# Patient Record
Sex: MALE | Race: WHITE | NOT HISPANIC OR LATINO | Employment: FULL TIME | URBAN - METROPOLITAN AREA
[De-identification: names, ages, dates, MRNs, and addresses within clinical notes are randomized per-mention and may not be internally consistent; named-entity substitution may affect disease eponyms.]

---

## 2017-01-10 ENCOUNTER — OFFICE VISIT (OUTPATIENT)
Dept: FAMILY MEDICINE | Facility: CLINIC | Age: 27
End: 2017-01-10
Payer: COMMERCIAL

## 2017-01-10 VITALS
RESPIRATION RATE: 18 BRPM | DIASTOLIC BLOOD PRESSURE: 72 MMHG | SYSTOLIC BLOOD PRESSURE: 120 MMHG | TEMPERATURE: 99 F | WEIGHT: 205.94 LBS | BODY MASS INDEX: 28.83 KG/M2 | HEART RATE: 72 BPM | HEIGHT: 71 IN

## 2017-01-10 DIAGNOSIS — R53.83 OTHER MALAISE AND FATIGUE: Primary | ICD-10-CM

## 2017-01-10 DIAGNOSIS — J11.1 FLU: ICD-10-CM

## 2017-01-10 DIAGNOSIS — R53.81 OTHER MALAISE AND FATIGUE: Primary | ICD-10-CM

## 2017-01-10 DIAGNOSIS — J32.9 SINUSITIS, UNSPECIFIED CHRONICITY, UNSPECIFIED LOCATION: ICD-10-CM

## 2017-01-10 DIAGNOSIS — R50.9 FEVER, UNSPECIFIED FEVER CAUSE: ICD-10-CM

## 2017-01-10 LAB
CTP QC/QA: YES
FLUAV AG NPH QL: POSITIVE
FLUBV AG NPH QL: NEGATIVE

## 2017-01-10 PROCEDURE — 1159F MED LIST DOCD IN RCRD: CPT | Mod: S$GLB,,, | Performed by: FAMILY MEDICINE

## 2017-01-10 PROCEDURE — 87804 INFLUENZA ASSAY W/OPTIC: CPT | Mod: QW,S$GLB,, | Performed by: FAMILY MEDICINE

## 2017-01-10 PROCEDURE — 99213 OFFICE O/P EST LOW 20 MIN: CPT | Mod: 25,S$GLB,, | Performed by: FAMILY MEDICINE

## 2017-01-10 PROCEDURE — 99999 PR PBB SHADOW E&M-EST. PATIENT-LVL II: CPT | Mod: PBBFAC,,, | Performed by: FAMILY MEDICINE

## 2017-01-10 PROCEDURE — 96372 THER/PROPH/DIAG INJ SC/IM: CPT | Mod: S$GLB,,, | Performed by: FAMILY MEDICINE

## 2017-01-10 RX ORDER — METHYLPREDNISOLONE ACETATE 40 MG/ML
40 INJECTION, SUSPENSION INTRA-ARTICULAR; INTRALESIONAL; INTRAMUSCULAR; SOFT TISSUE
Status: COMPLETED | OUTPATIENT
Start: 2017-01-10 | End: 2017-01-10

## 2017-01-10 RX ORDER — FLUTICASONE PROPIONATE 50 MCG
1 SPRAY, SUSPENSION (ML) NASAL 2 TIMES DAILY
Qty: 1 BOTTLE | Refills: 11 | Status: SHIPPED | OUTPATIENT
Start: 2017-01-10 | End: 2017-06-19

## 2017-01-10 RX ORDER — PROMETHAZINE HYDROCHLORIDE AND CODEINE PHOSPHATE 6.25; 1 MG/5ML; MG/5ML
5 SOLUTION ORAL EVERY 6 HOURS PRN
Qty: 150 ML | Refills: 1 | Status: SHIPPED | OUTPATIENT
Start: 2017-01-10 | End: 2017-06-19

## 2017-01-10 RX ORDER — LINCOMYCIN HYDROCHLORIDE 300 MG/ML
600 INJECTION, SOLUTION INTRAMUSCULAR; INTRAVENOUS; SUBCONJUNCTIVAL
Status: COMPLETED | OUTPATIENT
Start: 2017-01-10 | End: 2017-01-10

## 2017-01-10 RX ORDER — OSELTAMIVIR PHOSPHATE 75 MG/1
75 CAPSULE ORAL 2 TIMES DAILY
Qty: 10 CAPSULE | Refills: 0 | Status: SHIPPED | OUTPATIENT
Start: 2017-01-10 | End: 2017-06-19

## 2017-01-10 RX ORDER — AZITHROMYCIN 250 MG/1
250 TABLET, FILM COATED ORAL DAILY
Qty: 6 TABLET | Refills: 0 | Status: SHIPPED | OUTPATIENT
Start: 2017-01-10 | End: 2017-01-15

## 2017-01-10 RX ORDER — AZELASTINE 1 MG/ML
1 SPRAY, METERED NASAL 2 TIMES DAILY
Qty: 1 ML | Refills: 11 | Status: SHIPPED | OUTPATIENT
Start: 2017-01-10 | End: 2017-06-19

## 2017-01-10 RX ADMIN — LINCOMYCIN HYDROCHLORIDE 600 MG: 300 INJECTION, SOLUTION INTRAMUSCULAR; INTRAVENOUS; SUBCONJUNCTIVAL at 12:01

## 2017-01-10 RX ADMIN — METHYLPREDNISOLONE ACETATE 40 MG: 40 INJECTION, SUSPENSION INTRA-ARTICULAR; INTRALESIONAL; INTRAMUSCULAR; SOFT TISSUE at 12:01

## 2017-01-10 NOTE — PROGRESS NOTES
Subjective:       Patient ID: Rojelio Perez is a 26 y.o. male.    Chief Complaint: Cough; Nasal Congestion; Fever; and Generalized Body Aches    HPI Comments: Pt is a 26 y.o. male who presents for check up for Other malaise and fatigue & 102 fever  (primary encounter diagnosis)  Fever, unspecified fever cause. Doing well on current meds. Denies any side effects. Prevention is up to date.    Review of Systems   Constitutional: Positive for fever. Negative for appetite change.   HENT: Negative for congestion, ear pain, sneezing and sore throat.    Eyes: Negative for redness and visual disturbance.   Respiratory: Negative for cough, chest tightness and stridor.    Cardiovascular: Negative for chest pain.   Gastrointestinal: Negative for abdominal pain, blood in stool, diarrhea, nausea and vomiting.   Genitourinary: Negative for difficulty urinating, dysuria and hematuria.   Musculoskeletal: Negative for arthralgias, back pain, joint swelling, myalgias and neck pain.   Skin: Negative for rash.   Neurological: Negative for dizziness.   Psychiatric/Behavioral: Negative for agitation. The patient is not nervous/anxious.        Objective:      Physical Exam   Constitutional: He is oriented to person, place, and time. He appears well-developed and well-nourished.   HENT:   Head: Normocephalic.   Eyes: Pupils are equal, round, and reactive to light.   Neck: Normal range of motion. Neck supple. No thyromegaly present.   Cardiovascular: Normal rate and regular rhythm.  Exam reveals no friction rub.    No murmur heard.  Pulmonary/Chest: Effort normal. No respiratory distress. He has no wheezes.   Abdominal: There is no tenderness. There is no rebound and no guarding.   Musculoskeletal: Normal range of motion. He exhibits no edema or tenderness.   Lymphadenopathy:     He has no cervical adenopathy.   Neurological: He is alert and oriented to person, place, and time. He has normal reflexes. No cranial nerve deficit.   Skin: Skin  is warm and dry.   Psychiatric: He has a normal mood and affect. Judgment and thought content normal.       Assessment:       1. Other malaise and fatigue    2. Fever, unspecified fever cause    3. Sinusitis, unspecified chronicity, unspecified location    4. Flu        Plan:   Rojelio was seen today for cough, nasal congestion, fever and generalized body aches.    Diagnoses and all orders for this visit:    Other malaise and fatigue  -     POCT Influenza A/B    Fever, unspecified fever cause  -     promethazine-codeine 6.25-10 mg/5 ml (PHENERGAN WITH CODEINE) 6.25-10 mg/5 mL syrup; Take 5 mLs by mouth every 6 (six) hours as needed for Cough.  -     azithromycin (Z-LAUREN) 250 MG tablet; Take 1 tablet (250 mg total) by mouth once daily.  -     azelastine (ASTELIN) 137 mcg (0.1 %) nasal spray; 1 spray (137 mcg total) by Nasal route 2 (two) times daily.  -     fluticasone (FLONASE) 50 mcg/actuation nasal spray; 1 spray by Each Nare route 2 (two) times daily.    Sinusitis, unspecified chronicity, unspecified location  -     promethazine-codeine 6.25-10 mg/5 ml (PHENERGAN WITH CODEINE) 6.25-10 mg/5 mL syrup; Take 5 mLs by mouth every 6 (six) hours as needed for Cough.  -     azithromycin (Z-LAUREN) 250 MG tablet; Take 1 tablet (250 mg total) by mouth once daily.  -     azelastine (ASTELIN) 137 mcg (0.1 %) nasal spray; 1 spray (137 mcg total) by Nasal route 2 (two) times daily.  -     fluticasone (FLONASE) 50 mcg/actuation nasal spray; 1 spray by Each Nare route 2 (two) times daily.    Flu  -     oseltamivir (TAMIFLU) 75 MG capsule; Take 1 capsule (75 mg total) by mouth 2 (two) times daily.    Other orders  -     lincomycin injection 600 mg; Inject 2 mLs (600 mg total) into the muscle one time.  -     methylPREDNISolone acetate injection 40 mg; Inject 1 mL (40 mg total) into the muscle one time.

## 2017-01-10 NOTE — MR AVS SNAPSHOT
[place] [address]  [facility] [address]  Phone: [phone]  Fax: [phone]                  Rojelio Perez   1/10/2017 10:45 AM   Office Visit    Description:  Male : 1990   Provider:  Tyler Lewis MD   Department:  [facility]           Reason for Visit     Cough     Nasal Congestion     Fever     Generalized Body Aches           Diagnoses this Visit        Comments    Other malaise and fatigue    -  Primary     Fever, unspecified fever cause         Sinusitis, unspecified chronicity, unspecified location         Flu                To Do List           Goals (5 Years of Data)     None       These Medications        Disp Refills Start End    promethazine-codeine 6.25-10 mg/5 ml (PHENERGAN WITH CODEINE) 6.25-10 mg/5 mL syrup 150 mL 1 1/10/2017     Take 5 mLs by mouth every 6 (six) hours as needed for Cough. - Oral    Pharmacy: [facility]/pharmacy #[address] [place] [address] [place] - [address] Ph #: [phone]       azithromycin (Z-LAUREN) 250 MG tablet 6 tablet 0 1/10/2017 1/15/2017    Take 1 tablet (250 mg total) by mouth once daily. - Oral    Pharmacy: [facility]/pharmacy #[address] [place] [address] [place] - [address] Ph #: [phone]       azelastine (ASTELIN) 137 mcg (0.1 %) nasal spray 1 mL 11 1/10/2017     1 spray (137 mcg total) by Nasal route 2 (two) times daily. - Nasal    Pharmacy: [facility]/pharmacy #[address] [place] [address] [place] - [address] Ph #: [phone]       fluticasone (FLONASE) 50 mcg/actuation nasal spray 1 Bottle 11 1/10/2017     1 spray by Each Nare route 2 (two) times daily. - Each Nare    Pharmacy: [facility]/pharmacy #[address] [place] [address] [place] - [address] Ph #: [phone]       oseltamivir (TAMIFLU) 75 MG capsule 10 capsule 0 1/10/2017     Take 1 capsule (75 mg total) by mouth 2 (two) times daily. - Oral    Pharmacy: [facility]/pharmacy #[address] [place] [address] [place] - [address] Ph #: [phone]         [facility]     [facility] Care [facility] -  Assistance  Registered nurses in  the [facility] provide clinical advisement, health education, appointment booking, and other advisory services.  Call for this free service at [phone].             Medications           Message regarding Medications     Verify the changes and/or additions to your medication regime listed below are the same as discussed with your clinician today.  If any of these changes or additions are incorrect, please notify your healthcare provider.        START taking these NEW medications        Refills    promethazine-codeine 6.25-10 mg/5 ml (PHENERGAN WITH CODEINE) 6.25-10 mg/5 mL syrup 1    Sig: Take 5 mLs by mouth every 6 (six) hours as needed for Cough.    Class: Normal    Route: Oral    azithromycin (Z-LAUREN) 250 MG tablet 0    Sig: Take 1 tablet (250 mg total) by mouth once daily.    Class: Normal    Route: Oral    azelastine (ASTELIN) 137 mcg (0.1 %) nasal spray 11    Si spray (137 mcg total) by Nasal route 2 (two) times daily.    Class: Normal    Route: Nasal    fluticasone (FLONASE) 50 mcg/actuation nasal spray 11    Si spray by Each Nare route 2 (two) times daily.    Class: Normal    Route: Each Nare    oseltamivir (TAMIFLU) 75 MG capsule 0    Sig: Take 1 capsule (75 mg total) by mouth 2 (two) times daily.    Class: Normal    Route: Oral      These medications were administered today        Dose Freq    lincomycin injection 600 mg 600 mg Clinic/HOD 1 time    Sig: Inject 2 mLs (600 mg total) into the muscle one time.    Class: Normal    Route: Intramuscular    methylPREDNISolone acetate injection 40 mg 40 mg Clinic/HOD 1 time    Sig: Inject 1 mL (40 mg total) into the muscle one time.    Class: Normal    Route: Intramuscular      STOP taking these medications     alprazolam (XANAX) 0.5 MG tablet TAKE 1 TABLET BY MOUTH EVERY DAY AS NEEDED           Verify that the below list of medications is an accurate representation of the medications you are currently taking.  If none reported, the list  "may be blank. If incorrect, please contact your healthcare provider. Carry this list with you in case of emergency.           Current Medications     azelastine (ASTELIN) 137 mcg (0.1 %) nasal spray 1 spray (137 mcg total) by Nasal route 2 (two) times daily.    azithromycin (Z-LAUREN) 250 MG tablet Take 1 tablet (250 mg total) by mouth once daily.    clobetasol (TEMOVATE) 0.05 % cream Apply topically 2 (two) times daily.    fluticasone (FLONASE) 50 mcg/actuation nasal spray 1 spray by Each Nare route 2 (two) times daily.    oseltamivir (TAMIFLU) 75 MG capsule Take 1 capsule (75 mg total) by mouth 2 (two) times daily.    promethazine-codeine 6.25-10 mg/5 ml (PHENERGAN WITH CODEINE) 6.25-10 mg/5 mL syrup Take 5 mLs by mouth every 6 (six) hours as needed for Cough.    tramadol (ULTRAM) 50 mg tablet Take 1 tablet (50 mg total) by mouth every 6 (six) hours as needed for Pain.    vilazodone 40 mg Tab Take 1 capsule by mouth once.           Clinical Reference Information           Vital Signs - Last Recorded  Most recent update: 1/10/2017 11:58 AM by Ursula Beyer LPN    BP Pulse Temp Resp    120/72 (BP Location: Right arm, Patient Position: Sitting, BP Method: Manual) 72 99.1 °F (37.3 °C) (Tympanic) 18    Ht Wt BMI    5' 11" (1.803 m) 93.4 kg (205 lb 14.6 oz) 28.72 kg/m2      Blood Pressure          Most Recent Value    BP  120/72      Allergies as of 1/10/2017     Ceclor [Cefaclor]      Immunizations Administered on Date of Encounter - 1/10/2017     None      Orders Placed During Today's Visit      Normal Orders This Visit    POCT Influenza A/B          1/10/2017 11:58 AM - Sharon Castanon      Component Results     Component Value Flag Ref Range Units Status    Rapid Influenza A Ag Positive (A) Negative  Final    Rapid Influenza B Ag Negative  Negative  Final     Acceptable Yes    Final            [facility] Sign-Up     Activating your [facility] account is as easy as 1-2-3!     1) Visit [email or web address], " select Sign Up Now, enter this activation code and your date of birth, then select Next.  [email or web address]  Expires: 2/24/2017 12:17 PM      2) Create a username and password to use when you visit [facility] in the future and select a security question in case you lose your password and select Next.    3) Enter your e-mail address and click Sign Up!    Additional Information  If you have questions, please e-mail [email or web address] or call [phone] to talk to our [facility] staff. Remember, [facility] is NOT to be used for urgent needs. For medical emergencies, dial 911.

## 2017-01-12 ENCOUNTER — TELEPHONE (OUTPATIENT)
Dept: FAMILY MEDICINE | Facility: CLINIC | Age: 27
End: 2017-01-12

## 2017-01-12 NOTE — TELEPHONE ENCOUNTER
----- Message from Isaiah Rodrigues sent at 2017  9:14 AM CST -----  Contact: Patient  Rojelio Perez  MRN: [id number]  : 1990  PCP: Manuel Gomez  Home Phone      [phone]  Work Phone      Not on file.  Mobile          [phone]      MESSAGE: requesting work excuse until tomorrow -- ran fever again last night -- fax to [phone]    Call [phone]    PCP: Gomez (saw Marcello)

## 2017-03-14 ENCOUNTER — HOSPITAL ENCOUNTER (EMERGENCY)
Facility: HOSPITAL | Age: 27
Discharge: HOME | End: 2017-03-14
Attending: FAMILY MEDICINE
Payer: COMMERCIAL

## 2017-03-14 VITALS
BODY MASS INDEX: 27.2 KG/M2 | SYSTOLIC BLOOD PRESSURE: 120 MMHG | TEMPERATURE: 97 F | WEIGHT: 195 LBS | RESPIRATION RATE: 17 BRPM | DIASTOLIC BLOOD PRESSURE: 72 MMHG | HEART RATE: 60 BPM

## 2017-03-14 DIAGNOSIS — M54.50 CHRONIC LEFT-SIDED LOW BACK PAIN WITHOUT SCIATICA: Primary | ICD-10-CM

## 2017-03-14 DIAGNOSIS — G89.29 CHRONIC LEFT-SIDED LOW BACK PAIN WITHOUT SCIATICA: Primary | ICD-10-CM

## 2017-03-14 PROCEDURE — 63600175 PHARM REV CODE 636 W HCPCS: Performed by: FAMILY MEDICINE

## 2017-03-14 PROCEDURE — 96372 THER/PROPH/DIAG INJ SC/IM: CPT

## 2017-03-14 PROCEDURE — 99283 EMERGENCY DEPT VISIT LOW MDM: CPT | Mod: 25

## 2017-03-14 RX ORDER — DICLOFENAC SODIUM 75 MG/1
75 TABLET, DELAYED RELEASE ORAL 2 TIMES DAILY PRN
Qty: 15 TABLET | Refills: 0 | Status: SHIPPED | OUTPATIENT
Start: 2017-03-14 | End: 2017-09-19

## 2017-03-14 RX ORDER — HYDROMORPHONE HYDROCHLORIDE 2 MG/ML
2 INJECTION, SOLUTION INTRAMUSCULAR; INTRAVENOUS; SUBCUTANEOUS
Status: COMPLETED | OUTPATIENT
Start: 2017-03-14 | End: 2017-03-14

## 2017-03-14 RX ORDER — METHOCARBAMOL 750 MG/1
1500 TABLET, FILM COATED ORAL 3 TIMES DAILY
Qty: 30 TABLET | Refills: 0 | Status: SHIPPED | OUTPATIENT
Start: 2017-03-14 | End: 2017-03-24

## 2017-03-14 RX ORDER — PROMETHAZINE HYDROCHLORIDE 25 MG/ML
25 INJECTION, SOLUTION INTRAMUSCULAR; INTRAVENOUS
Status: COMPLETED | OUTPATIENT
Start: 2017-03-14 | End: 2017-03-14

## 2017-03-14 RX ADMIN — PROMETHAZINE HYDROCHLORIDE 25 MG: 25 INJECTION INTRAMUSCULAR; INTRAVENOUS at 01:03

## 2017-03-14 RX ADMIN — HYDROMORPHONE HYDROCHLORIDE 2 MG: 2 INJECTION INTRAMUSCULAR; INTRAVENOUS; SUBCUTANEOUS at 01:03

## 2017-03-14 NOTE — ED AVS SNAPSHOT
[facility] [address]  [facility] [address]               Rojelio Perez   3/14/2017 12:51 AM   ED    Description:  Male : 1990   Department:  [facility]           Your Care was Coordinated By:     Provider Role From To    Efrem Abbott MD Attending Provider 17 0051 --      Reason for Visit     Back Pain           Diagnoses this Visit        Comments    Chronic left-sided low back pain without sciatica    -  Primary       ED Disposition     ED Disposition Condition Comment    Discharge             To Do List           Follow-up Information     Follow up with Manuel Gomez MD In 1 week(s).    Specialty:  Family Medicine    Contact information:    [address]  [facility] [address]  [phone]         These Medications        Disp Refills Start End    methocarbamol (ROBAXIN) 750 MG Tab 30 tablet 0 3/14/2017 3/24/2017    Take 2 tablets (1,500 mg total) by mouth 3 (three) times daily. - Oral    Pharmacy: [facility]/pharmacy #[id number] - [place] - [address] Ph #: [phone]       diclofenac (VOLTAREN) 75 MG EC tablet 15 tablet 0 3/14/2017     Take 1 tablet (75 mg total) by mouth 2 (two) times daily as needed (Pain). - Oral    Pharmacy: [facility]/pharmacy #[id number] - [place] - [address] Ph #: [phone]         [facility]     [facility] Care [facility] -  Assistance  Registered nurses in the [facility] provide clinical advisement, health education, appointment booking, and other advisory services.  Call for this free service at [phone].             Medications           Message regarding Medications     Verify the changes and/or additions to your medication regime listed below are the same as discussed with your clinician today.  If any of these changes or additions are incorrect, please notify your healthcare provider.        START taking these NEW medications        Refills    methocarbamol (ROBAXIN)  750 MG Tab 0    Sig: Take 2 tablets (1,500 mg total) by mouth 3 (three) times daily.    Class: Print    Route: Oral    diclofenac (VOLTAREN) 75 MG EC tablet 0    Sig: Take 1 tablet (75 mg total) by mouth 2 (two) times daily as needed (Pain).    Class: Print    Route: Oral      These medications were administered today        Dose Freq    promethazine injection 25 mg 25 mg ED 1 Time    Sig: Inject 1 mL (25 mg total) into the muscle ED 1 Time.    Class: Normal    Route: Intramuscular    hydromorphone (PF) injection 2 mg 2 mg ED 1 Time    Sig: Inject 1 mL (2 mg total) into the muscle ED 1 Time.    Class: Normal    Route: Intramuscular           Verify that the below list of medications is an accurate representation of the medications you are currently taking.  If none reported, the list may be blank. If incorrect, please contact your healthcare provider. Carry this list with you in case of emergency.           Current Medications     azelastine (ASTELIN) 137 mcg (0.1 %) nasal spray 1 spray (137 mcg total) by Nasal route 2 (two) times daily.    clobetasol (TEMOVATE) 0.05 % cream Apply topically 2 (two) times daily.    diclofenac (VOLTAREN) 75 MG EC tablet Take 1 tablet (75 mg total) by mouth 2 (two) times daily as needed (Pain).    fluticasone (FLONASE) 50 mcg/actuation nasal spray 1 spray by Each Nare route 2 (two) times daily.    methocarbamol (ROBAXIN) 750 MG Tab Take 2 tablets (1,500 mg total) by mouth 3 (three) times daily.    oseltamivir (TAMIFLU) 75 MG capsule Take 1 capsule (75 mg total) by mouth 2 (two) times daily.    promethazine-codeine 6.25-10 mg/5 ml (PHENERGAN WITH CODEINE) 6.25-10 mg/5 mL syrup Take 5 mLs by mouth every 6 (six) hours as needed for Cough.    tramadol (ULTRAM) 50 mg tablet Take 1 tablet (50 mg total) by mouth every 6 (six) hours as needed for Pain.    vilazodone 40 mg Tab Take 1 capsule by mouth once.           Clinical Reference Information           Your Vitals Were     BP Pulse Temp  Resp Weight BMI    117/70 (BP Location: Left arm, Patient Position: Sitting) 62 97.2 °F (36.2 °C) (Oral) 17 88.5 kg (195 lb) 27.2 kg/m2      Allergies as of 3/14/2017        Reactions    Ceclor [Cefaclor] Other (See Comments)    unknown      Immunizations Administered on Date of Encounter - 3/14/2017     None      ED Micro, Lab, POCT     None      ED Imaging Orders     None        Discharge Instructions           Causes of Lumbar (Low Back) Pain  Low back pain can be caused by problems with any part of the lumbar spine. A disk can herniate (push out) and press on a nerve. Vertebrae can rub against each other or slip out of place. This can irritate facet joints and nerves. It can also lead to stenosis, a narrowing of the spinal canal or foramen.  Pressure from a disk  Constant wear and tear on a disk can cause it to weaken and push outward. Part of the disk may then press on nearby nerves. There are two common types of herniated disks:  Contained means the soft nucleus is protruding outward.   Extruded means the firm annulus has torn, letting the soft center squeeze through.     Pressure from bone  An unstable spine   With age, a disk may thin and wear out. Vertebrae above and below the disk may begin to touch. This can put pressure on nerves. It can also cause bone spurs (growths) to form where the bones rub together.    Stenosis results when bone spurs narrow the foramen or spinal canal. This also puts pressure on nerves. Slipping vertebrae can irritate nerves and joints. They can also worsen stenosis.    In some cases, vertebrae become unstable and slip forward. This is called spondylolisthesis.     Date Last Reviewed: 10/12/2015  © 6053-7496 [employer]. [address] [place] [address], [place] [address]. All rights reserved. This information is not intended as a substitute for professional medical care. Always follow your healthcare professional's instructions.          Self-Care for Low Back Pain    Most  people have low back pain now and then. In many cases, it isnt serious and self-care can help. Sometimes low back pain can be a sign of a bigger problem. Call your healthcare provider if your pain returns often or gets worse over time. For the long-term care of your back, get regular exercise, lose any excess weight and learn good posture.  Take a short rest  Lying down during the day may be beneficial for short periods of time if severe pain increases with sitting or standing. Long-term bed rest could be detrimental.  Reduce pain and swelling  Cold reduces swelling. Both cold and heat can reduce pain. Protect your skin by placing a towel between your body and the ice or heat source.  · For the first few days, apply an ice pack for 15 to 20 minutes .  · After the first few days, try heat for 15 minutes at a time to ease pain. Never sleep on a heating pad.  · Over-the-counter medicine can help control pain and swelling. Try aspirin or ibuprofen.  Exercise  Exercise can help your back heal. It also helps your back get stronger and more flexible, preventing any reinjury. Ask your healthcare provider about specific exercises for your back.  Use good posture to avoid reinjury  · When moving, bend at the hips and knees. Dont bend at the waist or twist around.  · When lifting, keep the object close to your body. Dont try to lift more than you can handle.  · When sitting, keep your lower back supported. Use a rolled-up towel as needed.  Seek immediate medical care if:  · Youre unable to stand or walk.  · You have a temperature over 100.4°F (38.0°C)  · You have frequent, painful, or bloody urination.  · You have severe abdominal pain.  · You have a sharp, stabbing pain.  · Your pain is constant.  · You have pain or numbness in your leg.  · You feel pain in a new area of your back.  · You notice that the pain isnt decreasing after more than a week.   Date Last Reviewed: 9/29/2015  © 7084-7843 [employer]  [address] [place] [address]. All rights reserved. This information is not intended as a substitute for professional medical care. Always follow your healthcare professional's instructions.          [facility] Sign-Up     Activating your [facility] account is as easy as 1-2-3!     1) Visit [email or web address], select Sign Up Now, enter this activation code and your date of birth, then select Next.  [email or web address]  Expires: 4/28/2017  1:49 AM      2) Create a username and password to use when you visit [facility] in the future and select a security question in case you lose your password and select Next.    3) Enter your e-mail address and click Sign Up!    Additional Information  If you have questions, please e-mail [email or web address] or call [phone] to talk to our [facility] staff. Remember, [facility] is NOT to be used for urgent needs. For medical emergencies, dial 911.          [facility] complies with applicable Federal civil rights laws and does not discriminate on the basis of race, color, national origin, age, disability, or sex.        Language Assistance Services     ATTENTION: Language assistance services are available, free of charge. Please call [phone].      ATENCIÓN: Si habla español, tiene a messer disposición servicios gratuitos de asistencia lingüística. Llame al [phone].     CHÚ Ý: N?u b?n nói Ti?ng Vi?t, có các d?ch v? h? tr? ngôn ng? mi?n phí dành cho b?n. G?i s? [phone].

## 2017-03-14 NOTE — DISCHARGE INSTRUCTIONS
Causes of Lumbar (Low Back) Pain  Low back pain can be caused by problems with any part of the lumbar spine. A disk can herniate (push out) and press on a nerve. Vertebrae can rub against each other or slip out of place. This can irritate facet joints and nerves. It can also lead to stenosis, a narrowing of the spinal canal or foramen.  Pressure from a disk  Constant wear and tear on a disk can cause it to weaken and push outward. Part of the disk may then press on nearby nerves. There are two common types of herniated disks:  Contained means the soft nucleus is protruding outward.   Extruded means the firm annulus has torn, letting the soft center squeeze through.     Pressure from bone  An unstable spine   With age, a disk may thin and wear out. Vertebrae above and below the disk may begin to touch. This can put pressure on nerves. It can also cause bone spurs (growths) to form where the bones rub together.    Stenosis results when bone spurs narrow the foramen or spinal canal. This also puts pressure on nerves. Slipping vertebrae can irritate nerves and joints. They can also worsen stenosis.    In some cases, vertebrae become unstable and slip forward. This is called spondylolisthesis.     Date Last Reviewed: 10/12/2015  © 1475-7040 [employer]. [address] [place] [address], [place] [address]. All rights reserved. This information is not intended as a substitute for professional medical care. Always follow your healthcare professional's instructions.          Self-Care for Low Back Pain    Most people have low back pain now and then. In many cases, it isnt serious and self-care can help. Sometimes low back pain can be a sign of a bigger problem. Call your healthcare provider if your pain returns often or gets worse over time. For the long-term care of your back, get regular exercise, lose any excess weight and learn good posture.  Take a short rest  Lying down during the day may be beneficial for  short periods of time if severe pain increases with sitting or standing. Long-term bed rest could be detrimental.  Reduce pain and swelling  Cold reduces swelling. Both cold and heat can reduce pain. Protect your skin by placing a towel between your body and the ice or heat source.  · For the first few days, apply an ice pack for 15 to 20 minutes .  · After the first few days, try heat for 15 minutes at a time to ease pain. Never sleep on a heating pad.  · Over-the-counter medicine can help control pain and swelling. Try aspirin or ibuprofen.  Exercise  Exercise can help your back heal. It also helps your back get stronger and more flexible, preventing any reinjury. Ask your healthcare provider about specific exercises for your back.  Use good posture to avoid reinjury  · When moving, bend at the hips and knees. Dont bend at the waist or twist around.  · When lifting, keep the object close to your body. Dont try to lift more than you can handle.  · When sitting, keep your lower back supported. Use a rolled-up towel as needed.  Seek immediate medical care if:  · Youre unable to stand or walk.  · You have a temperature over 100.4°F (38.0°C)  · You have frequent, painful, or bloody urination.  · You have severe abdominal pain.  · You have a sharp, stabbing pain.  · Your pain is constant.  · You have pain or numbness in your leg.  · You feel pain in a new area of your back.  · You notice that the pain isnt decreasing after more than a week.   Date Last Reviewed: 9/29/2015  © 5521-4311 [employer]. [address] [place] [address], [place] [address]. All rights reserved. This information is not intended as a substitute for professional medical care. Always follow your healthcare professional's instructions.

## 2017-03-14 NOTE — PROVIDER PROGRESS NOTES - EMERGENCY DEPT.
Encounter Date: 3/14/2017    ED Physician Progress Notes        Physician Note:   Patient is resting more comfortably now no spasm no difficulty laying flat.
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
## 2017-06-01 ENCOUNTER — APPOINTMENT (OUTPATIENT)
Dept: RADIOLOGY | Facility: CLINIC | Age: 27
End: 2017-06-01
Attending: FAMILY MEDICINE
Payer: COMMERCIAL

## 2017-06-01 ENCOUNTER — OFFICE VISIT (OUTPATIENT)
Dept: FAMILY MEDICINE | Facility: CLINIC | Age: 27
End: 2017-06-01
Payer: COMMERCIAL

## 2017-06-01 VITALS
HEIGHT: 71 IN | HEART RATE: 84 BPM | BODY MASS INDEX: 26.04 KG/M2 | DIASTOLIC BLOOD PRESSURE: 70 MMHG | WEIGHT: 186 LBS | SYSTOLIC BLOOD PRESSURE: 114 MMHG

## 2017-06-01 DIAGNOSIS — S06.0XAA: ICD-10-CM

## 2017-06-01 DIAGNOSIS — G89.29 CHRONIC MIDLINE LOW BACK PAIN WITHOUT SCIATICA: ICD-10-CM

## 2017-06-01 DIAGNOSIS — S00.03XA CONTUSION OF SCALP, INITIAL ENCOUNTER: ICD-10-CM

## 2017-06-01 DIAGNOSIS — M54.50 CHRONIC MIDLINE LOW BACK PAIN WITHOUT SCIATICA: ICD-10-CM

## 2017-06-01 PROCEDURE — 70250 X-RAY EXAM OF SKULL: CPT | Mod: TC,PO

## 2017-06-01 PROCEDURE — 99999 PR PBB SHADOW E&M-EST. PATIENT-LVL III: CPT | Mod: PBBFAC,,, | Performed by: FAMILY MEDICINE

## 2017-06-01 PROCEDURE — 70250 X-RAY EXAM OF SKULL: CPT | Mod: 26,,, | Performed by: RADIOLOGY

## 2017-06-01 PROCEDURE — 99213 OFFICE O/P EST LOW 20 MIN: CPT | Mod: S$GLB,,, | Performed by: FAMILY MEDICINE

## 2017-06-01 RX ORDER — TRAMADOL HYDROCHLORIDE 50 MG/1
50 TABLET ORAL EVERY 6 HOURS PRN
Qty: 60 TABLET | Refills: 5 | Status: SHIPPED | OUTPATIENT
Start: 2017-06-01 | End: 2017-06-01 | Stop reason: SDUPTHER

## 2017-06-01 RX ORDER — LORAZEPAM 0.5 MG/1
0.5 TABLET ORAL EVERY 12 HOURS PRN
Qty: 45 TABLET | Refills: 1 | Status: SHIPPED | OUTPATIENT
Start: 2017-06-01 | End: 2017-06-19

## 2017-06-01 RX ORDER — TRAMADOL HYDROCHLORIDE 50 MG/1
50 TABLET ORAL EVERY 12 HOURS PRN
Qty: 60 TABLET | Refills: 5 | Status: SHIPPED | OUTPATIENT
Start: 2017-06-01 | End: 2017-11-28

## 2017-06-01 NOTE — PROGRESS NOTES
Subjective:       Patient ID: Rojelio Perez is a 27 y.o. male.    Chief Complaint: Head Injury    Head Injury    The incident occurred 12 to 24 hours ago. The quality of the pain is described as throbbing. The pain is at a severity of 3/10. Associated symptoms include headaches. Pertinent negatives include no vomiting.     Review of Systems   Constitutional: Negative for appetite change.   HENT: Negative for congestion, ear pain, sneezing and sore throat.         Bright lights hurt his vision   Eyes: Negative for redness and visual disturbance.   Respiratory: Negative for cough, chest tightness and stridor.    Cardiovascular: Negative for chest pain.   Gastrointestinal: Negative for abdominal pain, blood in stool, diarrhea, nausea and vomiting.   Genitourinary: Negative for difficulty urinating, dysuria and hematuria.   Musculoskeletal: Negative for arthralgias, back pain, joint swelling, myalgias and neck pain.   Skin: Negative for rash.   Neurological: Positive for headaches. Negative for dizziness.        3/10   Psychiatric/Behavioral: Negative for agitation. The patient is not nervous/anxious.        Objective:      Physical Exam   Constitutional: He is oriented to person, place, and time. He appears well-developed and well-nourished.   HENT:   Head: Normocephalic.   PERRLA   Eyes: Pupils are equal, round, and reactive to light.   Neck: Normal range of motion. Neck supple. No thyromegaly present.   Cardiovascular: Normal rate and regular rhythm.  Exam reveals no friction rub.    No murmur heard.  Pulmonary/Chest: Effort normal. No respiratory distress. He has no wheezes.   Abdominal: There is no tenderness. There is no rebound and no guarding.   Musculoskeletal: Normal range of motion. He exhibits no edema or tenderness.   Lymphadenopathy:     He has no cervical adenopathy.   Neurological: He is alert and oriented to person, place, and time. He has normal reflexes. No cranial nerve deficit.   Skin: Skin is warm  and dry.   Psychiatric: He has a normal mood and affect. Judgment and thought content normal.       Assessment:       1. Contusion of scalp, initial encounter        Plan:   Rojelio was seen today for head injury.    Diagnoses and all orders for this visit:    Contusion of scalp, initial encounter

## 2017-06-15 ENCOUNTER — TELEPHONE (OUTPATIENT)
Dept: FAMILY MEDICINE | Facility: CLINIC | Age: 27
End: 2017-06-15

## 2017-06-15 NOTE — TELEPHONE ENCOUNTER
: WOULD LIKE TO DISCUSS PT'S MEDICATION. SAYS THAT TRAMADOL ISN'T REALLY HELPING WITH PAIN AND HE HASN'T BEEN SLEEPING. PLEASE CALL

## 2017-06-15 NOTE — TELEPHONE ENCOUNTER
----- Message from Tai Gonzales sent at 6/15/2017  8:16 AM CDT -----  Contact: SHERIN / S/O  Rojelio Perez  MRN: [id number]  : 1990  PCP: Manuel Gomez  Home Phone      [phone]  Work Phone      Not on file.  Mobile          [phone]      MESSAGE: WOULD LIKE TO DISCUSS PT'S MEDICATION. SAYS THAT TRAMADOL ISN'T REALLY HELPING WITH PAIN AND HE HASN'T BEEN SLEEPING. PLEASE CALL      PHONE: [phone]    PHARMACY: [facility]

## 2017-06-15 NOTE — TELEPHONE ENCOUNTER
"----- Message from Ginny Eaton sent at 6/15/2017 12:57 PM CDT -----  Contact: Wife - Kaity Perez  MRN: [id number]  : 1990  PCP: Manuel Gomez  Home Phone      [phone]  Work Phone      Not on file.  Mobile          [phone]      MESSAGE:   Calling again, experiencing back "flare up" - needs either a medication prescribed or an appointment. Still has tramadol, has been taking ibuprofen with it - not helping - [facility] - [phone]  "

## 2017-06-15 NOTE — TELEPHONE ENCOUNTER
"WOULD LIKE TO DISCUSS PT'S MEDICATION. SAYS THAT TRAMADOL ISN'T REALLY HELPING WITH PAIN AND HE HASN'T BEEN SLEEPING. PLEASE CALL      Calling again, experiencing back "flare up" - needs either a medication prescribed or an appointment. Still has tramadol, has been taking ibuprofen with it - not helping - [facility] [phone]  Pt was last seen on 6/1/17 for a scalp contusion  "

## 2017-06-16 NOTE — TELEPHONE ENCOUNTER
----- Message from Isaiah Rodrigues sent at 2017  8:23 AM CDT -----  Contact: Patient  Rojelio Perez  MRN: [id number]  : 1990  PCP: Manuel Gomez  Home Phone      [phone]  Work Phone      Not on file.  Mobile          [phone]      MESSAGE: called yesterday Re: back pain -- no response -- please advise    Call [phone]    PCP: kelsey Lewis

## 2017-06-19 ENCOUNTER — TELEPHONE (OUTPATIENT)
Dept: FAMILY MEDICINE | Facility: CLINIC | Age: 27
End: 2017-06-19

## 2017-06-19 ENCOUNTER — OFFICE VISIT (OUTPATIENT)
Dept: FAMILY MEDICINE | Facility: CLINIC | Age: 27
End: 2017-06-19
Payer: COMMERCIAL

## 2017-06-19 VITALS
DIASTOLIC BLOOD PRESSURE: 68 MMHG | HEIGHT: 71 IN | WEIGHT: 189.81 LBS | SYSTOLIC BLOOD PRESSURE: 110 MMHG | BODY MASS INDEX: 26.57 KG/M2 | RESPIRATION RATE: 16 BRPM | HEART RATE: 74 BPM

## 2017-06-19 DIAGNOSIS — M48.07 SPINAL STENOSIS OF LUMBOSACRAL REGION: ICD-10-CM

## 2017-06-19 DIAGNOSIS — F41.9 ANXIETY: Primary | ICD-10-CM

## 2017-06-19 PROCEDURE — 99999 PR PBB SHADOW E&M-EST. PATIENT-LVL III: CPT | Mod: PBBFAC,,, | Performed by: FAMILY MEDICINE

## 2017-06-19 PROCEDURE — 99214 OFFICE O/P EST MOD 30 MIN: CPT | Mod: S$GLB,,, | Performed by: FAMILY MEDICINE

## 2017-06-19 RX ORDER — HYDROCODONE BITARTRATE AND ACETAMINOPHEN 7.5; 325 MG/1; MG/1
1 TABLET ORAL NIGHTLY
Qty: 30 TABLET | Refills: 0 | Status: SHIPPED | OUTPATIENT
Start: 2017-06-19 | End: 2017-06-19 | Stop reason: SDUPTHER

## 2017-06-19 RX ORDER — CYCLOBENZAPRINE HCL 10 MG
10 TABLET ORAL NIGHTLY
Qty: 30 TABLET | Refills: 1 | Status: SHIPPED | OUTPATIENT
Start: 2017-06-19 | End: 2017-06-29

## 2017-06-19 RX ORDER — HYDROCODONE BITARTRATE AND ACETAMINOPHEN 7.5; 325 MG/1; MG/1
1 TABLET ORAL NIGHTLY
Qty: 30 TABLET | Refills: 0 | Status: SHIPPED | OUTPATIENT
Start: 2017-06-19 | End: 2017-06-29

## 2017-06-19 RX ORDER — ALPRAZOLAM 0.25 MG/1
0.25 TABLET ORAL NIGHTLY PRN
Qty: 30 TABLET | Refills: 2 | Status: SHIPPED | OUTPATIENT
Start: 2017-06-19 | End: 2017-09-19

## 2017-06-19 NOTE — TELEPHONE ENCOUNTER
[facility] calling, out of [place]--want to know if you can please send to [facility]? They have already cancelled it on their end.

## 2017-06-19 NOTE — PROGRESS NOTES
Subjective:       Patient ID: Rojelio Perez is a 27 y.o. male.    Chief Complaint: Back Pain (lower back )    Pt is a 27 y.o. male who presents for check up for Anxiety  (primary encounter diagnosis)  Spinal stenosis of lumbosacral region. Doing well on current meds. Denies any side effects. Prevention is up to date.      Review of Systems   Constitutional: Negative for appetite change.   HENT: Negative for congestion, ear pain, sneezing and sore throat.    Eyes: Negative for redness and visual disturbance.   Respiratory: Negative for cough, chest tightness and stridor.    Cardiovascular: Negative for chest pain.   Gastrointestinal: Negative for abdominal pain, blood in stool, diarrhea, nausea and vomiting.   Genitourinary: Negative for difficulty urinating, dysuria and hematuria.   Musculoskeletal: Positive for back pain. Negative for arthralgias, joint swelling, myalgias and neck pain.   Skin: Negative for rash.   Neurological: Negative for dizziness.   Psychiatric/Behavioral: Negative for agitation. The patient is not nervous/anxious.        Objective:      Physical Exam   Constitutional: He is oriented to person, place, and time. He appears well-developed and well-nourished.   HENT:   Head: Normocephalic.   Eyes: Pupils are equal, round, and reactive to light.   Neck: Normal range of motion. Neck supple. No thyromegaly present.   Cardiovascular: Normal rate and regular rhythm.  Exam reveals no friction rub.    No murmur heard.  Pulmonary/Chest: Effort normal. No respiratory distress. He has no wheezes.   Abdominal: There is no tenderness. There is no rebound and no guarding.   Musculoskeletal: Normal range of motion. He exhibits no edema or tenderness.   Back pain is terrible   Lymphadenopathy:     He has no cervical adenopathy.   Neurological: He is alert and oriented to person, place, and time. He has normal reflexes. No cranial nerve deficit.   Skin: Skin is warm and dry.   Psychiatric: He has a normal mood  and affect. Judgment and thought content normal.       Assessment:       1. Anxiety    2. Spinal stenosis of lumbosacral region        Plan:   Rojelio was seen today for back pain.    Diagnoses and all orders for this visit:    Anxiety    Spinal stenosis of lumbosacral region  -     MRI Lumbar Spine Without Contrast; Future  -     hydrocodone-acetaminophen 7.5-325mg (NORCO) 7.5-325 mg per tablet; Take 1 tablet by mouth every evening.    Other orders  -     alprazolam (XANAX) 0.25 MG tablet; Take 1 tablet (0.25 mg total) by mouth nightly as needed for Anxiety.  -     cyclobenzaprine (FLEXERIL) 10 MG tablet; Take 1 tablet (10 mg total) by mouth every evening.

## 2017-06-27 ENCOUNTER — TELEPHONE (OUTPATIENT)
Dept: FAMILY MEDICINE | Facility: CLINIC | Age: 27
End: 2017-06-27

## 2017-06-27 NOTE — TELEPHONE ENCOUNTER
LOV 6/19/17-Patient is scheduled for MRI tomorrow -- [id number] is required for authorization -- needs to be done today -- please contact [facility] with status

## 2017-06-27 NOTE — TELEPHONE ENCOUNTER
Peer to peer info:    Good Afternoon,       Your request for #[id number] scheduled for 17 needs a peer to peer review as per [facility].  Please call [phone] and follow prompts to diagnostic imaging and then peer to peer and include member's ID#[id number]  :1990.  This case may not be ready for scheduled appt time.  Is this case medically urgent? Please advise. Peer to peer has to be done today  before 5pm or case will close.      Thank you,   Pre-Service   [facility]   [phone]

## 2017-06-27 NOTE — TELEPHONE ENCOUNTER
----- Message from Isaiah Rodrigues sent at 2017 10:17 AM CDT -----  Contact: [facility] in Pre-service  Rojelio Perez  MRN: [id number]  : 1990  PCP: Manuel Gomez  Home Phone      [phone]  Work Phone      Not on file.  Mobile          [phone]      MESSAGE: Patient is scheduled for MRI tomorrow -- [id number] is required for authorization -- needs to be done today -- please contact [facility] with status     Call [phone]    PCP: linda Lewis

## 2017-06-28 ENCOUNTER — HOSPITAL ENCOUNTER (OUTPATIENT)
Dept: RADIOLOGY | Facility: HOSPITAL | Age: 27
Discharge: HOME | End: 2017-06-28
Attending: FAMILY MEDICINE
Payer: COMMERCIAL

## 2017-06-28 DIAGNOSIS — M48.07 SPINAL STENOSIS OF LUMBOSACRAL REGION: ICD-10-CM

## 2017-06-28 PROCEDURE — 72148 MRI LUMBAR SPINE W/O DYE: CPT | Mod: 26,,, | Performed by: RADIOLOGY

## 2017-06-28 PROCEDURE — 72148 MRI LUMBAR SPINE W/O DYE: CPT | Mod: TC

## 2017-06-29 ENCOUNTER — TELEPHONE (OUTPATIENT)
Dept: FAMILY MEDICINE | Facility: CLINIC | Age: 27
End: 2017-06-29

## 2017-06-29 NOTE — TELEPHONE ENCOUNTER
----- Message from Tyler Lewis MD sent at 6/28/2017  5:37 PM CDT -----  The following lab results were abnormal. The following recommendations were made:  Can tell Rojelio  His MRI is the same as 2015; getting seen by a pin doc might his next best option for pain relief as Dr Whalen, if he is wanting to go that route-not surgery

## 2017-09-18 ENCOUNTER — TELEPHONE (OUTPATIENT)
Dept: FAMILY MEDICINE | Facility: CLINIC | Age: 27
End: 2017-09-18

## 2017-09-18 NOTE — TELEPHONE ENCOUNTER
----- Message from Isaiah Rodrigues sent at 2017  2:51 PM CDT -----  Contact: Patient  Rojelio Perez  MRN: [id number]  : 1990  PCP: Tyler Lewis  Home Phone      [phone]  Work Phone      Not on file.  Mobile          [phone]      MESSAGE: possible pink eye -- URI -- requesting appt tomorrow     Call [phone]    PCP: Joshua

## 2017-09-19 ENCOUNTER — OFFICE VISIT (OUTPATIENT)
Dept: FAMILY MEDICINE | Facility: CLINIC | Age: 27
End: 2017-09-19
Payer: COMMERCIAL

## 2017-09-19 VITALS
BODY MASS INDEX: 25.83 KG/M2 | HEART RATE: 72 BPM | TEMPERATURE: 98 F | SYSTOLIC BLOOD PRESSURE: 132 MMHG | HEIGHT: 71 IN | RESPIRATION RATE: 18 BRPM | DIASTOLIC BLOOD PRESSURE: 80 MMHG | WEIGHT: 184.5 LBS

## 2017-09-19 DIAGNOSIS — J01.90 ACUTE SINUSITIS, RECURRENCE NOT SPECIFIED, UNSPECIFIED LOCATION: Primary | ICD-10-CM

## 2017-09-19 DIAGNOSIS — B30.0 EKC (EPIDEMIC KERATOCONJUNCTIVITIS): ICD-10-CM

## 2017-09-19 PROCEDURE — 99213 OFFICE O/P EST LOW 20 MIN: CPT | Mod: 25,S$GLB,, | Performed by: FAMILY MEDICINE

## 2017-09-19 PROCEDURE — 96372 THER/PROPH/DIAG INJ SC/IM: CPT | Mod: S$GLB,,, | Performed by: FAMILY MEDICINE

## 2017-09-19 PROCEDURE — 3008F BODY MASS INDEX DOCD: CPT | Mod: S$GLB,,, | Performed by: FAMILY MEDICINE

## 2017-09-19 PROCEDURE — 99999 PR PBB SHADOW E&M-EST. PATIENT-LVL III: CPT | Mod: PBBFAC,,, | Performed by: FAMILY MEDICINE

## 2017-09-19 RX ORDER — SULFACETAMIDE SODIUM 100 MG/ML
2 SOLUTION/ DROPS OPHTHALMIC EVERY 4 HOURS
Qty: 15 DROP | Refills: 0 | Status: SHIPPED | OUTPATIENT
Start: 2017-09-19 | End: 2017-09-29

## 2017-09-19 RX ORDER — METHYLPREDNISOLONE ACETATE 40 MG/ML
60 INJECTION, SUSPENSION INTRA-ARTICULAR; INTRALESIONAL; INTRAMUSCULAR; SOFT TISSUE
Status: COMPLETED | OUTPATIENT
Start: 2017-09-19 | End: 2017-09-19

## 2017-09-19 RX ORDER — DOXYCYCLINE HYCLATE 100 MG
100 TABLET ORAL EVERY 12 HOURS
Qty: 14 TABLET | Refills: 0 | Status: SHIPPED | OUTPATIENT
Start: 2017-09-19 | End: 2017-09-29

## 2017-09-19 RX ORDER — CETIRIZINE HYDROCHLORIDE 10 MG/1
10 TABLET ORAL DAILY
Qty: 30 TABLET | Refills: 5 | Status: SHIPPED | OUTPATIENT
Start: 2017-09-19 | End: 2017-11-28

## 2017-09-19 RX ADMIN — METHYLPREDNISOLONE ACETATE 60 MG: 40 INJECTION, SUSPENSION INTRA-ARTICULAR; INTRALESIONAL; INTRAMUSCULAR; SOFT TISSUE at 01:09
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
## 2017-11-18 ENCOUNTER — TELEPHONE (OUTPATIENT)
Dept: FAMILY MEDICINE | Facility: CLINIC | Age: 27
End: 2017-11-18

## 2017-11-18 NOTE — TELEPHONE ENCOUNTER
----- Message from Isaiah Rodrigues sent at 2017  9:32 AM CST -----  Contact: Father - Rob Perez  MRN: [id number]  : 1990  PCP: Tyler Lewis  Home Phone      [phone]  Work Phone      Not on file.  Mobile          [phone]      MESSAGE: falling asleep - lack of coordination -- requesting appt today    Call Rob @ [phone]    PCP: Joshua

## 2017-11-18 NOTE — TELEPHONE ENCOUNTER
Spoke with father, he states pt has been staggering around, being sleepy, encouraged father to bring him to ER for eval

## 2017-11-27 ENCOUNTER — TELEPHONE (OUTPATIENT)
Dept: FAMILY MEDICINE | Facility: CLINIC | Age: 27
End: 2017-11-27
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
## 2017-11-28 ENCOUNTER — OFFICE VISIT (OUTPATIENT)
Dept: FAMILY MEDICINE | Facility: CLINIC | Age: 27
End: 2017-11-28
Payer: COMMERCIAL

## 2017-11-28 VITALS
SYSTOLIC BLOOD PRESSURE: 110 MMHG | WEIGHT: 197.31 LBS | HEART RATE: 66 BPM | DIASTOLIC BLOOD PRESSURE: 76 MMHG | HEIGHT: 72 IN | RESPIRATION RATE: 16 BRPM | BODY MASS INDEX: 26.73 KG/M2

## 2017-11-28 DIAGNOSIS — M25.561 RECURRENT PAIN OF RIGHT KNEE: ICD-10-CM

## 2017-11-28 DIAGNOSIS — M79.642 PAIN IN BOTH HANDS: ICD-10-CM
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
## 2017-11-28 DIAGNOSIS — S83.241A TEAR OF MEDIAL MENISCUS OF RIGHT KNEE, CURRENT, UNSPECIFIED TEAR TYPE, INITIAL ENCOUNTER: ICD-10-CM

## 2017-11-28 DIAGNOSIS — S00.03XA CONTUSION OF SCALP, INITIAL ENCOUNTER: Primary | ICD-10-CM

## 2017-11-28 DIAGNOSIS — M79.641 PAIN IN BOTH HANDS: ICD-10-CM

## 2017-11-28 PROCEDURE — 99213 OFFICE O/P EST LOW 20 MIN: CPT | Mod: S$GLB,,, | Performed by: FAMILY MEDICINE

## 2017-11-28 PROCEDURE — 99999 PR PBB SHADOW E&M-EST. PATIENT-LVL III: CPT | Mod: PBBFAC,,, | Performed by: FAMILY MEDICINE

## 2017-11-28 RX ORDER — CYCLOBENZAPRINE HCL 5 MG
TABLET ORAL
Qty: 30 TABLET | Refills: 1 | Status: SHIPPED | OUTPATIENT
Start: 2017-11-28 | End: 2023-09-26

## 2017-11-28 RX ORDER — TRAMADOL HYDROCHLORIDE AND ACETAMINOPHEN 37.5; 325 MG/1; MG/1
1 TABLET, FILM COATED ORAL 2 TIMES DAILY PRN
Qty: 60 TABLET | Refills: 1 | Status: SHIPPED | OUTPATIENT
Start: 2017-11-28 | End: 2017-12-08

## 2017-11-28 RX ORDER — CELECOXIB 200 MG/1
200 CAPSULE ORAL DAILY
Qty: 30 CAPSULE | Refills: 5 | Status: SHIPPED | OUTPATIENT
Start: 2017-11-28 | End: 2019-01-28 | Stop reason: SDUPTHER
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
## 2017-11-28 NOTE — PATIENT INSTRUCTIONS
Understanding Meniscal Tears    The meniscus is a tough cushion of fibrous tissue called cartilage in the knee joint. It cushions the knee. It absorbs shock and helps spread weight across the knee joint. It also works with other parts of the knee to help keep the joint stable. Injury or aging can cause the meniscus to tear and lead to pain and problems using the knee.   What causes meniscal tears?  A sudden injury can tear the meniscus. This is often because of planting the foot and twisting the knee. Sports such as soccer, football, and basketball are often involved. Repeated actions, such as squatting, may also lead to a tear. Breakdown of the meniscus because of aging can also lead to tears.  Symptoms of meniscal tears  These can include:  · Knee pain  · Knee swelling  · Catching of the knee or inability to straighten the knee  · Unstable feeling in the knee  Treatment for meniscal tears  A tear is unlikely to heal on its own. You often will need surgery to repair a tear. In many cases, your healthcare provider will first try treatments to help relieve symptoms. These may include:  · Rest the knee. This means avoiding any activity that puts stress on the knee joint. These include kneeling, squatting, jogging, and climbing stairs. In some cases, you may need to use crutches for a time to keep body weight off of the knee joint.  · Cold pack. Putting a cold pack on the knee helps reduce pain and swelling.  · Knee brace. Bracing the knee helps support it.  · Medicine. Prescription and over-the-counter pain medicines can help relieve swelling and pain.  · Exercises. Exercises help strengthen the muscles of the leg to help support the knee joint.  If these treatments dont help relieve symptoms or the injury is severe, you may need surgery. This can repair the meniscus to relieve symptoms and restore movement.     When to call your healthcare provider  Call your healthcare provider right away if you have any of  these:  · Fever of 100.4°F (38°C) or higher, or as directed  · Pain or swelling that gets worse, including pain in the calf  · Numbness or tingling in leg or foot  · You suddenly cant put any weight on your leg  · Your knee locks   Date Last Reviewed: 3/10/2016  © 0085-1275 [employer]. [address] [place] [address]. All rights reserved. This information is not intended as a substitute for professional medical care. Always follow your healthcare professional's instructions.        Treating Meniscus Problems  The type of surgery you have depends on the nature of your tear. its size and location. Your surgeon may use arthroscopy, a method that involves putting a tiny camera inside your knee, so that your doctor can clearly see your joint. Arthroscopy requires only small incisions (cuts), and you can usually go home the same day as surgery. During surgery, you may have local anesthesia (your doctor numbs your knee with medicine), a regional block (numbing your body from the waist down), or general anesthesia (your doctor gives you drugs to put you into a deep sleep so you do't feel pain.)  Pre-op checklist  · Don't eat or drink 10 hours before surgery.  · Arrange for someone to drive you home after surgery.  · Tell your doctor if you take any medicine, supplements, or herbal remedies.        Repair  For certain tears, your surgeon will try to repair the meniscus. He or she will sew torn edges so they can heal properly. Or your surgeon will use special fasteners to repair damage. In some cases, repairs may require another incision at the back or side of your knee.       Removal  In most cases, your surgeon will remove the damaged part of your meniscus. The meniscus won't completely grow back, so your doctor will remove as little tissue as possible. Other tissue, called the articular cartilage, will take over the role as shock absorber for your knee joint.       After surgery  You'll spend  some time in the recovery area and can go home when you've recovered from the anesthesia. Your knee will be bandaged, and you may have stitches, steri-strips, or staples. You may need crutches to keep weight off the knee and may have a splint for support.  Date Last Reviewed: 9/4/2015  © 8932-0324 [employer]. [address] [place] [address], [place] [address]. All rights reserved. This information is not intended as a substitute for professional medical care. Always follow your healthcare professional's instructions.

## 2018-01-19 ENCOUNTER — APPOINTMENT (OUTPATIENT)
Dept: RADIOLOGY | Facility: CLINIC | Age: 28
End: 2018-01-19
Attending: FAMILY MEDICINE
Payer: COMMERCIAL

## 2018-01-19 ENCOUNTER — OFFICE VISIT (OUTPATIENT)
Dept: FAMILY MEDICINE | Facility: CLINIC | Age: 28
End: 2018-01-19
Payer: COMMERCIAL

## 2018-01-19 VITALS
RESPIRATION RATE: 16 BRPM | BODY MASS INDEX: 27.59 KG/M2 | HEART RATE: 72 BPM | DIASTOLIC BLOOD PRESSURE: 60 MMHG | SYSTOLIC BLOOD PRESSURE: 110 MMHG | WEIGHT: 197.06 LBS | HEIGHT: 71 IN

## 2018-01-19 DIAGNOSIS — M25.541 PAIN IN JOINT OF RIGHT HAND: ICD-10-CM

## 2018-01-19 DIAGNOSIS — S83.241A TEAR OF MEDIAL MENISCUS OF RIGHT KNEE, CURRENT, UNSPECIFIED TEAR TYPE, INITIAL ENCOUNTER: Primary | ICD-10-CM
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
## 2018-01-19 DIAGNOSIS — R05.9 COUGH: ICD-10-CM

## 2018-01-19 DIAGNOSIS — S83.241A TEAR OF MEDIAL MENISCUS OF RIGHT KNEE, CURRENT, UNSPECIFIED TEAR TYPE, INITIAL ENCOUNTER: ICD-10-CM

## 2018-01-19 PROCEDURE — 99213 OFFICE O/P EST LOW 20 MIN: CPT | Mod: S$GLB,,, | Performed by: FAMILY MEDICINE

## 2018-01-19 PROCEDURE — 99999 PR PBB SHADOW E&M-EST. PATIENT-LVL III: CPT | Mod: PBBFAC,,, | Performed by: FAMILY MEDICINE

## 2018-01-19 PROCEDURE — 73560 X-RAY EXAM OF KNEE 1 OR 2: CPT | Mod: TC,PO,RT

## 2018-01-19 PROCEDURE — 73560 X-RAY EXAM OF KNEE 1 OR 2: CPT | Mod: 26,RT,, | Performed by: RADIOLOGY

## 2018-01-19 PROCEDURE — 73130 X-RAY EXAM OF HAND: CPT | Mod: TC,PO,RT

## 2018-01-19 PROCEDURE — 73130 X-RAY EXAM OF HAND: CPT | Mod: 26,RT,, | Performed by: RADIOLOGY

## 2018-01-19 RX ORDER — BENZONATATE 200 MG/1
200 CAPSULE ORAL 3 TIMES DAILY PRN
Qty: 30 CAPSULE | Refills: 1 | Status: SHIPPED | OUTPATIENT
Start: 2018-01-19 | End: 2018-01-29

## 2018-01-24 ENCOUNTER — HOSPITAL ENCOUNTER (OUTPATIENT)
Dept: RADIOLOGY | Facility: HOSPITAL | Age: 28
Discharge: HOME | End: 2018-01-24
Attending: FAMILY MEDICINE
Payer: COMMERCIAL

## 2018-01-24 DIAGNOSIS — S83.241A TEAR OF MEDIAL MENISCUS OF RIGHT KNEE, CURRENT, UNSPECIFIED TEAR TYPE, INITIAL ENCOUNTER: ICD-10-CM

## 2018-01-24 PROCEDURE — 73721 MRI JNT OF LWR EXTRE W/O DYE: CPT | Mod: 26,RT,, | Performed by: RADIOLOGY

## 2018-01-24 PROCEDURE — 73721 MRI JNT OF LWR EXTRE W/O DYE: CPT | Mod: TC,RT

## 2018-01-25 DIAGNOSIS — M23.206 OTHER OLD TEAR OF MENISCUS OF RIGHT KNEE, UNSPECIFIED MENISCUS: Primary | ICD-10-CM

## 2018-01-26 ENCOUNTER — TELEPHONE (OUTPATIENT)
Dept: FAMILY MEDICINE | Facility: CLINIC | Age: 28
End: 2018-01-26

## 2018-01-26 NOTE — PROGRESS NOTES
The following lab results were abnormal. The following recommendations were made:I spoke with Rojelio and will refer him to Dr JAJA Meyer in [place]

## 2018-02-07 ENCOUNTER — HOSPITAL ENCOUNTER (OUTPATIENT)
Dept: RADIOLOGY | Facility: HOSPITAL | Age: 28
Discharge: HOME | End: 2018-02-07
Attending: ORTHOPAEDIC SURGERY
Payer: COMMERCIAL

## 2018-02-07 DIAGNOSIS — M25.561 RIGHT KNEE PAIN: ICD-10-CM

## 2018-02-07 DIAGNOSIS — M79.641 RIGHT HAND PAIN: ICD-10-CM

## 2018-02-07 PROCEDURE — 73564 X-RAY EXAM KNEE 4 OR MORE: CPT | Mod: TC,RT

## 2018-02-07 PROCEDURE — 73130 X-RAY EXAM OF HAND: CPT | Mod: 26,RT,, | Performed by: RADIOLOGY

## 2018-02-07 PROCEDURE — 73564 X-RAY EXAM KNEE 4 OR MORE: CPT | Mod: 26,RT,, | Performed by: RADIOLOGY

## 2018-02-07 PROCEDURE — 73130 X-RAY EXAM OF HAND: CPT | Mod: TC,RT

## 2018-02-14 PROBLEM — S83.249A MEDIAL MENISCUS TEAR: Status: ACTIVE | Noted: 2018-02-14

## 2018-02-21 ENCOUNTER — TELEPHONE (OUTPATIENT)
Dept: FAMILY MEDICINE | Facility: CLINIC | Age: 28
End: 2018-02-21

## 2018-02-21 NOTE — TELEPHONE ENCOUNTER
----- Message from Chayo Rabago sent at 2018  3:57 PM CST -----  Contact: Self  Rojelio Perez  MRN: [id number]  : 1990  PCP: Tyler Lewis  Home Phone      [phone]  Work Phone      Not on file.  Mobile          [phone]    MESSAGE:   Requesting appointment for tomorrow for a follow up on a blood clot in his calf. Was treated by Dr. Meyer today, and he recommended that he be seen by his Primary Care doctor tomorrow.  Please call with an appointment time that he can be seen.    Phone: [phone]

## 2018-02-22 ENCOUNTER — TELEPHONE (OUTPATIENT)
Dept: FAMILY MEDICINE | Facility: CLINIC | Age: 28
End: 2018-02-22

## 2018-02-22 ENCOUNTER — OFFICE VISIT (OUTPATIENT)
Dept: FAMILY MEDICINE | Facility: CLINIC | Age: 28
End: 2018-02-22
Payer: COMMERCIAL

## 2018-02-22 VITALS
WEIGHT: 202.19 LBS | RESPIRATION RATE: 18 BRPM | DIASTOLIC BLOOD PRESSURE: 60 MMHG | BODY MASS INDEX: 28.31 KG/M2 | HEIGHT: 71 IN | HEART RATE: 100 BPM | SYSTOLIC BLOOD PRESSURE: 112 MMHG

## 2018-02-22 DIAGNOSIS — I82.432 ACUTE DEEP VEIN THROMBOSIS (DVT) OF POPLITEAL VEIN OF LEFT LOWER EXTREMITY: Primary | ICD-10-CM

## 2018-02-22 PROCEDURE — 3008F BODY MASS INDEX DOCD: CPT | Mod: S$GLB,,, | Performed by: FAMILY MEDICINE

## 2018-02-22 PROCEDURE — 99999 PR PBB SHADOW E&M-EST. PATIENT-LVL III: CPT | Mod: PBBFAC,,, | Performed by: FAMILY MEDICINE

## 2018-02-22 PROCEDURE — 99214 OFFICE O/P EST MOD 30 MIN: CPT | Mod: S$GLB,,, | Performed by: FAMILY MEDICINE

## 2018-02-22 RX ORDER — HYDROCODONE BITARTRATE AND ACETAMINOPHEN 5; 325 MG/1; MG/1
TABLET ORAL
COMMUNITY
Start: 2018-02-14 | End: 2018-03-11

## 2018-02-22 NOTE — PROGRESS NOTES
Subjective:       Patient ID: Rojelio Perez is a 27 y.o. male.    Chief Complaint: Blood Clot (in rt leg) and Edema    HPI  27 year old male s/p meniscus surgery 2/14 presents to clinic after w/u revealed DVT of of the right leg. He says that after surgery, on Sunday, he started with pain, swelling and decreased ability to walk. His knee swelling was improving, but his calf and leg swelling was worsening. He notes that he called his surgeon's office who recommended elevation, ice and anti-inflammatories. He was seen on Wednesday and u/s revealed a DVT. He was apparently told to get a prescription for xarelto via the NP. However, patient did not understand this and presents here having had no anticoagulation. He notes that his swelling has continued to worsened and he has pain with walking. No bruising. No bleeding. No family hx of clots. No tobacco use.    PMH, PSH, ALLERGIES, SH, FH reviewed in nurse's notes above  Medications reconciled in the nurse's notes      Review of Systems   Constitutional: Negative for chills and fever.   HENT: Negative for congestion, ear pain, postnasal drip, rhinorrhea, sore throat and trouble swallowing.    Eyes: Negative for redness and itching.   Respiratory: Negative for cough, shortness of breath and wheezing.    Cardiovascular: Positive for leg swelling. Negative for chest pain and palpitations.   Gastrointestinal: Negative for abdominal pain, diarrhea, nausea and vomiting.   Genitourinary: Negative for dysuria and frequency.   Musculoskeletal: Positive for arthralgias and gait problem.   Skin: Negative for rash.   Neurological: Negative for weakness and headaches.       Objective:      Physical Exam   Constitutional: He is oriented to person, place, and time. He appears well-developed. No distress.   HENT:   Head: Normocephalic and atraumatic.   Eyes: Conjunctivae are normal. Pupils are equal, round, and reactive to light.   Neck: Normal range of motion. Neck supple. No  thyromegaly present.   Cardiovascular: Normal rate, regular rhythm, normal heart sounds and intact distal pulses.    Pulmonary/Chest: Effort normal and breath sounds normal. No respiratory distress. He has no wheezes.   Abdominal: Soft. Bowel sounds are normal. There is no tenderness.   Musculoskeletal: Normal range of motion. He exhibits no edema.   Right leg > left leg.    +homans.    Normal Cap refill and normal distal pulses   Lymphadenopathy:     He has no cervical adenopathy.   Neurological: He is alert and oriented to person, place, and time.   Skin: Skin is warm and dry. No rash noted.   Psychiatric: He has a normal mood and affect. His behavior is normal.   Nursing note and vitals reviewed.       Assessment/Plan:       Problem List Items Addressed This Visit     None      Visit Diagnoses     Acute deep vein thrombosis (DVT) of popliteal vein of left lower extremity    -  Primary    Relevant Medications    apixaban 5 mg Tab        Given samples to start immediately.  10mg BID x 7 days then 5mg BID x 3 months.    Will f/u in 2 weeks for symptom check and PE.    If any shortness of breath, bleeding, worsening of pain/swelling or other issues rtc or go to ER.

## 2018-02-22 NOTE — TELEPHONE ENCOUNTER
Dr Lemus notified pt last night He needs to go to the ER for possible DVT Started 3 days ago.Has a follow up today with Dr Lemus. He went to Dr Meyer's office after trying to get seen for knee sweeling.They advised him to see his PCP

## 2018-02-22 NOTE — PATIENT INSTRUCTIONS
Deep Vein Thrombosis (DVT)    Deep vein thrombosis (DVT) occurs when a blood clot (thrombus) forms in a deep vein. This happens most often in the leg. It can also happen in the arms or other parts of the body. A part of the clot called an embolus can break off and travel to the lungs. When this happens, its called a pulmonary embolism (PE). PE is a medical emergency. It can cut off blood flow and lead to death. Both DVT and PE are closely related. Together, they are often referred to by the term venous thromboembolism (VTE).  Risk factors for DVT  Anything that slows blood flow, injures the lining of a vein, or increases blood clotting can make you more prone to having DVT. This includes the following:  · Long periods without movement (such as when sitting for many hours at a time or when recovering from major surgery or illness)  · Estrogen (female hormone) therapy, such as hormone replacement therapy (HRT) or oral contraceptives  · Fractured hip or leg  · Major surgery or joint replacement  · Major trauma or spinal cord injury  · Cancer  · Family history  · Excess weight or obesity  · Smoking  · Older age  Symptoms  DVT does not always cause symptoms. When symptoms do occur, they may appear around the site of the DVT, such as in the leg. Possible symptoms include:  · Swelling  · Pain  · Warmth  · Redness  · Tenderness  Home care  · You were likely prescribed blood thinners (anticoagulants). They may be given as pills (oral) or shots (injections). Follow all instructions when using these medicines. Note: Do not take blood thinners with other medicines, herbal remedies, or supplements without talking to your provider first. Certain medicines or products can affect how blood thinners work.  · Follow your providers instructions about activity and rest.  · If support or compression stockings are prescribed, wear them as directed. These may help improve blood flow in the legs.  · When sitting or lying down, move  your ankles, toes and knees often. This may also help improve blood flow in the legs.  Follow-up care  Follow up with your healthcare provider, or as advised. If imaging tests were done, they may need further review by a doctor. You will be told of any new findings that may affect your care.  When to seek medical advice  Call your healthcare provider right away if any of these occur:  · New or increased swelling, pain, tenderness, warmth, or redness, in the leg, arm, or other area  · Blood in the urine  · Bleeding with bowel movements  Call 911  Call 911 right away if any of these occur:  · Bleeding from the nose, gums, a cut, or vagina  · Heavy or uncontrolled bleeding  · Trouble breathing  · Chest pain or discomfort that worsens with deep breathing or coughing  · Coughing (may cough up blood)  · Fast heartbeat  · Sweating  · Anxiety  · Lightheadedness, dizziness, or fainting  Date Last Reviewed: 9/21/2015  © 5402-7868 [employer]. [address] [place] [address]. All rights reserved. This information is not intended as a substitute for professional medical care. Always follow your healthcare professional's instructions.        Complications of Deep Vein Thrombosis  Deep vein thrombosis (DVT) is a condition involving the formation of a blood clot or thrombus in a deep vein. One may develop in a large vein deep inside the leg, arm, or other part of the body. Complications from deep vein thrombosis can be very serious. They can include pulmonary embolism (PE), chronic venous insufficiency, and post-thrombotic syndrome.   You may hear healthcare providers use the term venous thromboembolism (VTE) to describe DVT and PE. They use the term VTE because the two conditions are very closely related. And, because their prevention and treatment are closely related.         A pulmonary embolism can block a blood vessel in the lungs and must be treated right away.      Pulmonary embolism  Pulmonary  embolism (PE) happens when part of the clot, called an embolus, separates from the vein. It travels to the lungs and cuts off the flow of blood. A PE may develop quickly. It is a medical emergency and may cause death.      When to seek medical advice  Call 911 or emergency help if you have symptoms of a blood clot in the lungs. Symptoms may include:  · Chest pain  · Trouble breathing or sudden shortness of breath  · Coughing (may cough up blood)  · Fainting  · Fast heartbeat  · Sweating  You may have bleeding if you take medicine to help prevent blood clots. Call 911 if you have heavy or uncontrolled bleeding.  When to call your healthcare provider  Call your healthcare provider if you have symptoms of a blood clot. The symptoms include:  · Swelling  · Pain  · Redness in your leg, arm, or other area  Call your healthcare provider if you have signs or symptoms of bleeding, like blood in the urine, bleeding with bowel movements, or bleeding from the nose, gums, a cut, or vagina.   Chronic venous insufficiency and post-thrombotic syndrome  Two other complications of deep vein thrombosis are chronic venous insufficiency and post-thrombotic syndrome.  Chronic venous insufficiency may happen following deep vein thrombosis of a leg vein. It means that a vein no longer works as well. It is a long-term condition where blood stays in the vein instead of flowing back to the heart. Pain and swelling in the leg are common symptoms.  Post-thrombotic syndrome may also happen following deep vein thrombosis of a leg vein. It is a long-term problem with pain, swelling, and redness. Ulcers and sores can also happen if the condition is not treated early. These complications and associated symptoms may make it difficult to walk and take part in daily activities.  Date Last Reviewed: 5/1/2016  © 3839-8984 [employer]. [address] [place] [address], [place] [address]. All rights reserved. This information is not intended as a  substitute for professional medical care. Always follow your healthcare professional's instructions.        Discharge Instructions for Deep Vein Thrombosis (DVT)  A blood clot or thrombus that forms in a large, deep vein is called a deep vein thrombosis (DVT). If a DVT is not treated, part of the clot (embolus) can break off and travel to your lungs. This is called a pulmonary embolus (PE). This can cut off the flow of blood to part or all of the lung. PE is a medical emergency and may cause death.   Healthcare providers use the term venous thromboembolism (VTE) to describe these two conditions: DVT and PE. They use the term VTE because the two conditions are very closely related. And because their prevention and treatment are also closely related.   Make sure you follow all instructions for taking your medicine, follow-up care, and diet and lifestyle changes.  Medicine  Your healthcare provider will usually prescribe an anticoagulant medicine. This medicine is a blood thinner that helps to prevent new blood clots. Anticoagulants can be given by mouth (oral), by injection, or into your vein (intravenous or IV). Commonly used anticoagulants include warfarin and heparin. Newer anticoagulants may also be used. They include rivaroxaban, apixaban, dabigatran, and enoxaparin. Your healthcare provider will provide specific instructions on how to take your anticoagulant. You may take more than one type for a period of time.  Make sure to take your anticoagulant exactly as directed. If you miss a dose, call your healthcare provider to find out what you should do. These medicines increase the chance of bleeding. So it is very important to take them correctly. Be sure to tell all of your healthcare providers, including dentists, that you are taking an anticoagulant.  Follow-up monitoring  If you are taking warfarin, you will need regular blood tests to see how it is working. These blood tests include a prothrombin time  (PT), also reported as an international normalized ratio (INR). Your dose of warfarin may be changed based on the test results. It is very important that you keep your testing appointments after you leave the hospital.  Be sure you understand the following information before you go home:   My goal PT/INR is between _____ and _____.   My next PT/INR blood draw will be on ______________ (date) at _______________ (time) at __________________________________________ (name of healthcare provider or clinic and address).   The name of the healthcare provider who will monitor my anticoagulation is ________________________ and the phone number is _________________________.  Depending on which anticoagulant you are prescribed, you may need other blood tests. Before you are discharged, your healthcare provider will review what testing is needed in detail.  Diet and warfarin  Vitamin K can interact with warfarin and reduce its ability to thin your blood. Vitamin K helps your blood clot. So sudden changes in vitamin K intake can affect the way warfarin works. You dont need to avoid foods with vitamin K. Instead, keep the amount you eat about the same each day. Foods high in vitamin K include:  · Leafy green vegetables like spinach, cabbage, and kale  · Avocado  · Asparagus  · Egg yolks  · Oils like canola, olive, and soybean  When taking warfarin, don't change your diet without first checking with your healthcare provider.  The other anticoagulants do not have the same interaction with vitamin K that warfarin does.   Medicines and your anticoagulant  Some medicines may cause problems with anticoagulant. Check with your healthcare provider before making any changes to your medicines. And don't take over-the-counter (OTC) medicines without checking with your provider. Some medicines interact with your anticoagulant and make your blood too thin. This increases your risk of bleeding. Others may stop your anticoagulant from doing  its job, making your blood too thick. So it is very important to tell your healthcare provider about all of the medicines you take, including OTCs and herbal supplements. Don't start or stop taking any medicine, including OTCs, unless your healthcare provider tells you to.  Medicines that may cause problems with your anticoagulant include:  · Some antibiotic medicines  · Some heart medicines  · Cimetidine  · Aspirin or other nonsteroidal anti-inflammatory drugs (NSAIDs) like ibuprofen, naproxen.  · Some medicines for depression, cancer, HIV infection, diabetes, seizures, gout, high cholesterol, or thyroid disease  · Vitamins with vitamin K  · Some herbal products like Mark's wort, garlic, coenzyme Q10, tumeric, and ginkgo biloba  Home care  To help prevent blood clots, you might do the following:  · Wiggle your toes and move your ankles while sitting or lying down.  · When traveling by car, make frequent stops to get up and move around.  · On long airplane rides, get up and move around when possible. If you cant get up, wiggle your toes, move your ankles and tighten your calves to keep your blood moving.  · If you have to stay in bed, do leg exercises.  · Wear support or compression stockings, if prescribed by your healthcare provider.  · Rest and put your legs up whenever they feel swollen or heavy.  · Raise the foot of your mattress 5 to 6 inches, using a foam wedge.  Lifestyle changes  To help you stay healthy, especially your heart and blood vessels, you should:  · Begin an exercise program, if you are not exercising. Ask your healthcare provider how to get started. Try walking, inside or out.  · Stay at a healthy weight. Get help to lose any extra pounds.  · Keep blood pressure in a healthy range  · If you smoke, make a plan to quit. Ask your healthcare provider about stop-smoking programs to help you quit.  Call 911  Call 911 right away if you have the following symptoms. They may mean a blood clot in  your lungs:  · Chest pain  · Trouble breathing  · Fast heartbeat  · Sweating  · Fainting  · Coughing (may cough up blood)  · Heavy or uncontrolled bleeding  When to call your healthcare provider  Call your healthcare provider if you have pain, swelling, or redness in your leg, arm, or other area. These symptoms may mean a blood clot.  If you take blood thinners and are bleeding, you may have:  · Blood in the urine   · Bleeding with bowel movements  · Very dark or tar-like stool  · Vomiting with blood  · Coughing with blood  · Bleeding from the nose  · Bleeding from the gums  · A cut that will not stop bleeding  · Bleeding from the vagina  Date Last Reviewed: 5/1/2016  © 8980-8168 [employer] [address] [place] [address], [place] [address]. All rights reserved. This information is not intended as a substitute for professional medical care. Always follow your healthcare professional's instructions.        Treating Deep Vein Thrombosis  The term venous thromboembolism (VTE) is used to describe two conditions, deep vein thrombosis (DVT) and pulmonary embolism (PE). They use the term VTE because the two conditions are very closely related, and because their prevention and treatment are closely related.   DVT is a condition in which a blood clot or thrombus forms in a deep vein. These blood clots are most common in the leg. But one may develop in the arm or another part of the body. Part of the clot (embolus) can separate from the vein and travel to the lungs. This is called a pulmonary embolism. This can cut off the flow of blood to the lung. It is a medical emergency and may cause death.   Over time a blood clot can also harm veins. To protect your health, blood clots must be treated right away.   Treating DVT at home or in the hospital  A blood clot is treated with blood thinner medicines called anticoagulants. These medicines prevent more blood clots from forming. A blood clot is often treated at home. But  your healthcare provider may have reasons to treat you in the hospital. It depends on your risks. It also depends on how severe your blood clot is and where it is.    Getting back to your regular activities as soon as possible helps to keep blood flowing. But your healthcare provider may want you to limit your activity for a period of time. Talk with him or her about what level of activity is right for you.  Medicine    Your healthcare provider will usually prescribe an anticoagulant to treat your blood clot. This is medicine that prevents blood clots. You take it by mouth (oral), by injection, or into a vein (intravenous or IV). Commonly used anticoagulants include warfarin and heparin. Other anticoagulants may also be used, including rivaroxaban, apixaban, dabigatran and enoxaparin. Make sure to take your anticoagulant exactly as directed.  If you are prescribed warfarin, it is important that you have regular blood tests as prescribed. Make sure you keep all appointments for lab tests, so that your healthcare provider knows whether to adjust your dosage. If you don't, you may not be getting the full benefit of the medicine or it may increase the risk of bleeding.   Other treatment   Other treatment includes the following:   · Your healthcare provider may prescribe special elastic (compression) stockings. These help the blood flow in your veins. The gentle pressure on the leg helps prevent blood from pooling and forming blood clots.   · When you are sitting or lying down, put your legs up on a pillow. Move your ankle and toes to help the blood flow.  · You may need medicine to help relieve pain. Your healthcare provider will discuss these choices with you.  Procedures  You may have a medical procedure for your blood clot. Procedures include:  · Thrombolysis. A thin tube (catheter) is used to deliver medicine to break up the clot. A device may also be used to break up the clot.  · Angioplasty. This may be done after  a clot is removed to help the blood flow better in the vein. A catheter with a balloon on the end is used to widen a vein. A tiny mesh tube (stent) may be placed to keep a vein open.  · Inferior vena cava filter placement. A small cone-shaped device is put in the inferior vena cava. The vena cava is the body's largest vein. The filter prevents a blood clot from traveling to your lungs. The filter is inserted into the vein through a catheter. This procedure may be done if blood thinners cannot be taken or if they don't work.     When to call your healthcare provider  Call your healthcare provider if you have the following symptoms:  · Pain, swelling, and redness in the leg, arm, or other area. They may mean a blood clot  · Blood in the urine  · Bleeding with bowel movements  · Very dark or tar-like stool  · Vomiting with blood  · Coughing up blood  · Nose bleeds  · Bleeding gums  · Bleeding from a cut that will not stop  · Vaginal bleeding  Call 911  Call 911 if you have the following symptoms. They may mean a blood clot in the lungs.  · Chest pain  · Trouble breathing  · Fast heartbeat  · Coughing (may cough up blood)  · Sweating  · Fainting  Call 911 if you have heavy or uncontrolled bleeding. Blood-thinning medicines increase the risk of bleeding.  Date Last Reviewed: 5/1/2016  © 0568-8048 [employer]. [address] [place] [address], [place] [address]. All rights reserved. This information is not intended as a substitute for professional medical care. Always follow your healthcare professional's instructions.

## 2018-02-28 ENCOUNTER — HOSPITAL ENCOUNTER (OUTPATIENT)
Dept: RADIOLOGY | Facility: HOSPITAL | Age: 28
Discharge: HOME | End: 2018-02-28
Attending: ORTHOPAEDIC SURGERY
Payer: COMMERCIAL

## 2018-02-28 DIAGNOSIS — M79.661 PAIN OF RIGHT LOWER LEG: ICD-10-CM

## 2018-02-28 PROCEDURE — 93971 EXTREMITY STUDY: CPT | Mod: TC

## 2018-02-28 PROCEDURE — 93971 EXTREMITY STUDY: CPT | Mod: 26,,, | Performed by: RADIOLOGY

## 2018-03-09 ENCOUNTER — OFFICE VISIT (OUTPATIENT)
Dept: FAMILY MEDICINE | Facility: CLINIC | Age: 28
End: 2018-03-09
Payer: COMMERCIAL

## 2018-03-09 VITALS
HEIGHT: 71 IN | DIASTOLIC BLOOD PRESSURE: 60 MMHG | SYSTOLIC BLOOD PRESSURE: 114 MMHG | HEART RATE: 88 BPM | WEIGHT: 195.31 LBS | BODY MASS INDEX: 27.34 KG/M2 | RESPIRATION RATE: 20 BRPM

## 2018-03-09 DIAGNOSIS — I82.409: Primary | ICD-10-CM

## 2018-03-09 PROCEDURE — 99999 PR PBB SHADOW E&M-EST. PATIENT-LVL III: CPT | Mod: PBBFAC,,, | Performed by: FAMILY MEDICINE

## 2018-03-09 PROCEDURE — 99213 OFFICE O/P EST LOW 20 MIN: CPT | Mod: S$GLB,,, | Performed by: FAMILY MEDICINE

## 2018-03-09 RX ORDER — TRAMADOL HYDROCHLORIDE AND ACETAMINOPHEN 37.5; 325 MG/1; MG/1
TABLET, FILM COATED ORAL
COMMUNITY
Start: 2018-02-23 | End: 2018-03-11

## 2018-03-09 RX ORDER — RIVAROXABAN 15 MG/1
TABLET, FILM COATED ORAL
COMMUNITY
Start: 2018-02-21 | End: 2018-03-09

## 2018-03-09 NOTE — PROGRESS NOTES
Subjective:       Patient ID: Rojelio Perez is a 27 y.o. male.    Chief Complaint: Follow-up    HPI  27 year old male with acute DVT after right knee meniscectomy via arthroscopic surgery comes in for f/u. 2 weeks of anticoagulation and he is feeling good. He has no swelling and does note that he has had minimal tightness but otherwise is almost back to normal. He took eliquis originally, but xarelto was much cheaper and he switched over. He has noticed a slight increase in bleeding, but nothing major. No issues. Will have u/s in 2 months.    PMH, PSH, ALLERGIES, SH, FH reviewed in nurse's notes above  Medications reconciled in the nurse's notes      Review of Systems   Constitutional: Negative for chills and fever.   HENT: Negative for congestion, ear pain, postnasal drip, rhinorrhea, sore throat and trouble swallowing.    Eyes: Negative for redness and itching.   Respiratory: Negative for cough, shortness of breath and wheezing.    Cardiovascular: Positive for leg swelling. Negative for chest pain and palpitations.   Gastrointestinal: Negative for abdominal pain, diarrhea, nausea and vomiting.   Genitourinary: Negative for dysuria and frequency.   Musculoskeletal: Positive for myalgias.   Skin: Negative for rash.   Neurological: Negative for weakness and headaches.       Objective:      Physical Exam   Constitutional: He is oriented to person, place, and time. He appears well-developed. No distress.   HENT:   Head: Normocephalic and atraumatic.   Eyes: Conjunctivae are normal. Pupils are equal, round, and reactive to light.   Neck: Normal range of motion. Neck supple. No thyromegaly present.   Cardiovascular: Normal rate, regular rhythm, normal heart sounds and intact distal pulses.    Pulmonary/Chest: Effort normal and breath sounds normal. No respiratory distress. He has no wheezes.   Abdominal: Soft. Bowel sounds are normal. There is no tenderness.   Musculoskeletal: Normal range of motion. He exhibits  no edema.   Lymphadenopathy:     He has no cervical adenopathy.   Neurological: He is alert and oriented to person, place, and time.   Skin: Skin is warm and dry. No rash noted.   Psychiatric: He has a normal mood and affect. His behavior is normal.   Nursing note and vitals reviewed.       Assessment/Plan:       Problem List Items Addressed This Visit     None      Visit Diagnoses     Acute deep venous thrombosis (DVT) determined by ultrasound    -  Primary    Relevant Medications    rivaroxaban (XARELTO) 20 mg Tab    Other Relevant Orders    VAS US Venous Leg Right        Once first 3 weeks completed of BID xarelto, change to daily dosing.    U/s in 3 months ordered today.    No need to limit physical activity.    RTC if condition acutely worsens or any other concerns, otherwise RTC as scheduled

## 2018-08-24 ENCOUNTER — HOSPITAL ENCOUNTER (EMERGENCY)
Facility: HOSPITAL | Age: 28
Discharge: HOME | End: 2018-08-24
Attending: INTERNAL MEDICINE
Payer: COMMERCIAL

## 2018-08-24 VITALS
TEMPERATURE: 98 F | HEART RATE: 64 BPM | WEIGHT: 165 LBS | SYSTOLIC BLOOD PRESSURE: 97 MMHG | OXYGEN SATURATION: 98 % | RESPIRATION RATE: 20 BRPM | BODY MASS INDEX: 23.01 KG/M2 | DIASTOLIC BLOOD PRESSURE: 50 MMHG

## 2018-08-24 DIAGNOSIS — S61.412A LACERATION OF SKIN OF LEFT HAND, INITIAL ENCOUNTER: Primary | ICD-10-CM

## 2018-08-24 PROCEDURE — 90715 TDAP VACCINE 7 YRS/> IM: CPT | Performed by: INTERNAL MEDICINE

## 2018-08-24 PROCEDURE — 25000003 PHARM REV CODE 250: Performed by: INTERNAL MEDICINE

## 2018-08-24 PROCEDURE — 12002 RPR S/N/AX/GEN/TRNK2.6-7.5CM: CPT

## 2018-08-24 PROCEDURE — 90471 IMMUNIZATION ADMIN: CPT | Performed by: INTERNAL MEDICINE

## 2018-08-24 PROCEDURE — 99283 EMERGENCY DEPT VISIT LOW MDM: CPT | Mod: 25

## 2018-08-24 PROCEDURE — 63600175 PHARM REV CODE 636 W HCPCS: Performed by: INTERNAL MEDICINE

## 2018-08-24 RX ORDER — LIDOCAINE HYDROCHLORIDE 10 MG/ML
10 INJECTION INFILTRATION; PERINEURAL
Status: COMPLETED | OUTPATIENT
Start: 2018-08-24 | End: 2018-08-24

## 2018-08-24 RX ORDER — OXYCODONE AND ACETAMINOPHEN 5; 325 MG/1; MG/1
2 TABLET ORAL
Status: COMPLETED | OUTPATIENT
Start: 2018-08-24 | End: 2018-08-24

## 2018-08-24 RX ORDER — LIDOCAINE HYDROCHLORIDE 10 MG/ML
INJECTION, SOLUTION EPIDURAL; INFILTRATION; INTRACAUDAL; PERINEURAL
Status: DISPENSED
Start: 2018-08-24

## 2018-08-24 RX ORDER — SULFAMETHOXAZOLE AND TRIMETHOPRIM 800; 160 MG/1; MG/1
1 TABLET ORAL 2 TIMES DAILY
Qty: 10 TABLET | Refills: 0 | Status: SHIPPED | OUTPATIENT
Start: 2018-08-24 | End: 2018-08-29

## 2018-08-24 RX ORDER — HYDROCODONE BITARTRATE AND ACETAMINOPHEN 5; 325 MG/1; MG/1
1 TABLET ORAL EVERY 4 HOURS PRN
Qty: 6 TABLET | Refills: 0 | OUTPATIENT
Start: 2018-08-24 | End: 2018-11-06

## 2018-08-24 RX ORDER — SULFAMETHOXAZOLE AND TRIMETHOPRIM 800; 160 MG/1; MG/1
1 TABLET ORAL
Status: COMPLETED | OUTPATIENT
Start: 2018-08-24 | End: 2018-08-24

## 2018-08-24 RX ADMIN — LIDOCAINE HYDROCHLORIDE 10 ML: 10 INJECTION, SOLUTION EPIDURAL; INFILTRATION; INTRACAUDAL; PERINEURAL at 01:08

## 2018-08-24 RX ADMIN — OXYCODONE AND ACETAMINOPHEN 2 TABLET: 5; 325 TABLET ORAL at 01:08

## 2018-08-24 RX ADMIN — SULFAMETHOXAZOLE AND TRIMETHOPRIM 1 TABLET: 800; 160 TABLET ORAL at 01:08

## 2018-08-24 RX ADMIN — CLOSTRIDIUM TETANI TOXOID ANTIGEN (FORMALDEHYDE INACTIVATED), CORYNEBACTERIUM DIPHTHERIAE TOXOID ANTIGEN (FORMALDEHYDE INACTIVATED), BORDETELLA PERTUSSIS TOXOID ANTIGEN (GLUTARALDEHYDE INACTIVATED), BORDETELLA PERTUSSIS FILAMENTOUS HEMAGGLUTININ ANTIGEN (FORMALDEHYDE INACTIVATED), BORDETELLA PERTUSSIS PERTACTIN ANTIGEN, AND BORDETELLA PERTUSSIS FIMBRIAE 2/3 ANTIGEN 0.5 ML: 5; 2; 2.5; 5; 3; 5 INJECTION, SUSPENSION INTRAMUSCULAR at 01:08

## 2018-08-24 NOTE — ED TRIAGE NOTES
28 y.o. male presents to ER ED 05/ED 05   Chief Complaint   Patient presents with    Laceration   Pt cut left hand with knife. No acute distress noted.

## 2018-08-24 NOTE — ED PROVIDER NOTES
Encounter Date: 2018       History     Chief Complaint   Patient presents with    Laceration     The history is provided by the patient.   Laceration    The incident occurred just prior to arrival. The laceration is located on the left hand. The laceration is 4 cm in size. The laceration mechanism was a a dirty knife. The pain is at a severity of 4/10. The pain has been constant since onset. His tetanus status is out of date.     Review of patient's allergies indicates:   Allergen Reactions    Ativan [lorazepam] Hallucinations    Ceclor [cefaclor] Other (See Comments)     unknown     Past Medical History:   Diagnosis Date    Anxiety     Bulging lumbar disc     Medial meniscus tear     RIGHT KNEE     Past Surgical History:   Procedure Laterality Date    WISDOM TOOTH EXTRACTION       Family History   Problem Relation Age of Onset    No Known Problems Mother     No Known Problems Father      Social History     Tobacco Use    Smoking status: Former Smoker     Types: Vaping w/o nicotine, Cigarettes     Last attempt to quit: 2017     Years since quittin.0    Smokeless tobacco: Never Used   Substance Use Topics    Alcohol use: No    Drug use: Yes     Types: Marijuana     Comment: DAILY     Review of Systems   All other systems reviewed and are negative.      Physical Exam     Initial Vitals [18 0042]   BP Pulse Resp Temp SpO2   (!) 97/50 64 20 98 °F (36.7 °C) 98 %      MAP       --         Physical Exam    Nursing note and vitals reviewed.  Constitutional: He appears well-developed and well-nourished.   HENT:   Head: Normocephalic and atraumatic.   Mouth/Throat: Oropharynx is clear and moist.   Eyes: EOM are normal. Pupils are equal, round, and reactive to light.   Neck: Normal range of motion. Neck supple. No thyromegaly present.   Cardiovascular: Normal rate, regular rhythm and intact distal pulses. Exam reveals no gallop and no friction rub.    No murmur heard.  Pulmonary/Chest: Breath  sounds normal.   Abdominal: Soft. Bowel sounds are normal. There is no tenderness.   Musculoskeletal: Normal range of motion.   Neurological: He is alert and oriented to person, place, and time. He has normal strength.   Skin: Skin is warm and dry. Capillary refill takes less than 2 seconds.   There is a 4 cm linear laceration of the dorsum of the left hand, overlying the first dorsal interosseous muscle. Full ROM is present; all pulses are present and cap refill is normal.         ED Course   Lac Repair  Date/Time: 8/24/2018 1:50 AM  Performed by: Tatyana Gaona MD  Authorized by: Tatyana Gaona MD   Body area: upper extremity (left 1st dorsal interosseous area)  Laceration length: 3 cm  Tendon involvement: none  Nerve involvement: none  Vascular damage: no  Anesthesia: local infiltration    Anesthesia:  Local Anesthetic: lidocaine 1% without epinephrine  Anesthetic total: 8 mL  Patient sedated: no  Preparation: Patient was prepped and draped in the usual sterile fashion.  Irrigation solution: saline  Irrigation method: tap  Amount of cleaning: standard  Debridement: none  Degree of undermining: none  Skin closure: 4-0 nylon  Number of sutures: 6  Technique: simple  Approximation: close  Approximation difficulty: simple  Dressing: 4x4 sterile gauze  Patient tolerance: Patient tolerated the procedure well with no immediate complications        Labs Reviewed - No data to display       Imaging Results          X-Ray Hand 3 view Left (Final result)  Result time 08/24/18 07:44:24    Final result by Lea Mcdonald MD (08/24/18 07:44:24)                 Impression:      As above.      Electronically signed by: Lea Mcdonald MD  Date:    08/24/2018  Time:    07:44             Narrative:    EXAMINATION:  XR HAND COMPLETE 3 VIEW LEFT    CLINICAL HISTORY:  laceration;.    TECHNIQUE:  PA, lateral, and oblique views of the left hand were performed.    COMPARISON:  None    FINDINGS:  Subcutaneous emphysema is seen within  the palmar soft tissues of the thenar eminence.  No radiopaque foreign bodies are seen.  No fracture or dislocation.                              X-Rays:   Independently Interpreted Readings:   Other Readings:  Left hand- no fx or foreign body                         Clinical Impression:   The encounter diagnosis was Laceration of skin of left hand, initial encounter.      Disposition:   Disposition: Discharged  Condition: Stable                        Tatyana Gaona MD  09/01/18 0942

## 2018-09-14 ENCOUNTER — TELEPHONE (OUTPATIENT)
Dept: FAMILY MEDICINE | Facility: CLINIC | Age: 28
End: 2018-09-14

## 2018-09-14 NOTE — TELEPHONE ENCOUNTER
Spoke with pt--appt made for 9/17/18 with --test for bacterial infection. Girlfriend keeps getting it.

## 2018-09-14 NOTE — TELEPHONE ENCOUNTER
----- Message from Isaiah Rodrigues sent at 2018  1:00 PM CDT -----  Contact: Patient  Rojelio Perez  MRN: [id number]  : 1990  PCP: Tyler Lewis  Home Phone      [phone]  Work Phone      Not on file.  Mobile          [phone]      MESSAGE: girlfriend has recurrent bacterial infection -- being treated with Doxicycline monohydrate -- requesting Rx for antibiotics - may be recurrent because he is not being treated also -- [facility] [place] -- has some questions     Call [phone]    PCP: Joshua

## 2018-11-05 ENCOUNTER — HOSPITAL ENCOUNTER (EMERGENCY)
Facility: HOSPITAL | Age: 28
Discharge: HOME | End: 2018-11-06
Attending: SURGERY
Payer: COMMERCIAL

## 2018-11-05 VITALS
OXYGEN SATURATION: 99 % | TEMPERATURE: 97 F | DIASTOLIC BLOOD PRESSURE: 77 MMHG | RESPIRATION RATE: 18 BRPM | SYSTOLIC BLOOD PRESSURE: 115 MMHG | BODY MASS INDEX: 22.38 KG/M2 | HEART RATE: 97 BPM | WEIGHT: 160.5 LBS

## 2018-11-05 DIAGNOSIS — S61.310A LACERATION OF RIGHT INDEX FINGER WITHOUT FOREIGN BODY WITH DAMAGE TO NAIL, INITIAL ENCOUNTER: Primary | ICD-10-CM

## 2018-11-05 PROCEDURE — 63600175 PHARM REV CODE 636 W HCPCS: Performed by: INTERNAL MEDICINE

## 2018-11-05 PROCEDURE — 12002 RPR S/N/AX/GEN/TRNK2.6-7.5CM: CPT

## 2018-11-05 PROCEDURE — 96372 THER/PROPH/DIAG INJ SC/IM: CPT

## 2018-11-05 PROCEDURE — 99284 EMERGENCY DEPT VISIT MOD MDM: CPT | Mod: 25

## 2018-11-05 RX ORDER — PROMETHAZINE HYDROCHLORIDE 25 MG/ML
12.5 INJECTION, SOLUTION INTRAMUSCULAR; INTRAVENOUS
Status: COMPLETED | OUTPATIENT
Start: 2018-11-05 | End: 2018-11-05

## 2018-11-05 RX ORDER — MORPHINE SULFATE 4 MG/ML
4 INJECTION, SOLUTION INTRAMUSCULAR; INTRAVENOUS
Status: COMPLETED | OUTPATIENT
Start: 2018-11-05 | End: 2018-11-05

## 2018-11-05 RX ADMIN — PROMETHAZINE HYDROCHLORIDE 12.5 MG: 25 INJECTION INTRAMUSCULAR; INTRAVENOUS at 11:11

## 2018-11-05 RX ADMIN — MORPHINE SULFATE 4 MG: 4 INJECTION, SOLUTION INTRAMUSCULAR; INTRAVENOUS at 11:11

## 2018-11-06 PROCEDURE — 25000003 PHARM REV CODE 250: Performed by: INTERNAL MEDICINE

## 2018-11-06 RX ORDER — SULFAMETHOXAZOLE AND TRIMETHOPRIM 800; 160 MG/1; MG/1
1 TABLET ORAL
Status: COMPLETED | OUTPATIENT
Start: 2018-11-06 | End: 2018-11-06

## 2018-11-06 RX ORDER — HYDROCODONE BITARTRATE AND ACETAMINOPHEN 5; 325 MG/1; MG/1
1 TABLET ORAL EVERY 4 HOURS PRN
Qty: 8 TABLET | Refills: 0 | Status: SHIPPED | OUTPATIENT
Start: 2018-11-06 | End: 2018-11-12 | Stop reason: SDUPTHER

## 2018-11-06 RX ORDER — LIDOCAINE HYDROCHLORIDE 10 MG/ML
5 INJECTION, SOLUTION EPIDURAL; INFILTRATION; INTRACAUDAL; PERINEURAL
Status: COMPLETED | OUTPATIENT
Start: 2018-11-06 | End: 2018-11-06

## 2018-11-06 RX ORDER — SULFAMETHOXAZOLE AND TRIMETHOPRIM 800; 160 MG/1; MG/1
1 TABLET ORAL 2 TIMES DAILY
Qty: 14 TABLET | Refills: 0 | Status: SHIPPED | OUTPATIENT
Start: 2018-11-06 | End: 2018-11-13

## 2018-11-06 RX ADMIN — SULFAMETHOXAZOLE AND TRIMETHOPRIM 1 TABLET: 800; 160 TABLET ORAL at 12:11

## 2018-11-06 RX ADMIN — LIDOCAINE HYDROCHLORIDE 50 MG: 10 INJECTION, SOLUTION EPIDURAL; INFILTRATION; INTRACAUDAL; PERINEURAL at 12:11

## 2018-11-06 NOTE — ED PROVIDER NOTES
Encounter Date: 2018       History     Chief Complaint   Patient presents with    Laceration     Pt suffered laceration to the dorsum of the right index finger when it was accidentally cut by hedge clippers. This happened about 5 hours ago, but patient did not want to miss work. He is here now for evaluation. UTD with tetanus booster (3 mos ago).          Review of patient's allergies indicates:   Allergen Reactions    Ativan [lorazepam] Hallucinations    Ceclor [cefaclor] Other (See Comments)     unknown     Past Medical History:   Diagnosis Date    Anxiety     Bulging lumbar disc     Medial meniscus tear     RIGHT KNEE     Past Surgical History:   Procedure Laterality Date    ARTHROSCOPY-KNEE Right 2018    [facility]    WISDOM TOOTH EXTRACTION       Family History   Problem Relation Age of Onset    No Known Problems Mother     No Known Problems Father      Social History     Tobacco Use    Smoking status: Former Smoker     Types: Vaping w/o nicotine, Cigarettes     Last attempt to quit: 2017     Years since quittin.1    Smokeless tobacco: Never Used   Substance Use Topics    Alcohol use: No    Drug use: Yes     Types: Marijuana     Comment: DAILY     Review of Systems   All other systems reviewed and are negative.      Physical Exam     Initial Vitals [18 2258]   BP Pulse Resp Temp SpO2   115/77 97 18 97.4 °F (36.3 °C) 99 %      MAP       --         Physical Exam    Nursing note and vitals reviewed.  Constitutional: He appears well-developed and well-nourished.   HENT:   Head: Normocephalic and atraumatic.   Mouth/Throat: Oropharynx is clear and moist.   Eyes: Conjunctivae and EOM are normal. Pupils are equal, round, and reactive to light.   Neck: Normal range of motion. Neck supple.   Cardiovascular: Normal rate, regular rhythm, normal heart sounds and intact distal pulses.   Pulmonary/Chest: Breath sounds normal.   Abdominal: Soft. Bowel  sounds are normal.   Musculoskeletal: Normal range of motion.   Neurological: He is alert and oriented to person, place, and time. He has normal strength.   Skin: Skin is warm and dry. Capillary refill takes less than 2 seconds.   There is a 4mm laceration through the  right indexfingernail parallel to the long axis of the nail. No active bleeding is noted. Pulses and sensation are intact.         ED Course   Lac Repair  Date/Time: 11/5/2018 11:45 PM  Performed by: Tatyana Gaona MD  Authorized by: Tatyana Gaona MD   Consent Done: Yes  Consent: Verbal consent obtained.  Consent given by: patient  Patient understanding: patient states understanding of the procedure being performed  Body area: upper extremity  Location details: right index finger  Laceration length: 4 cm  Foreign bodies: no foreign bodies  Tendon involvement: none  Nerve involvement: none  Vascular damage: no  Anesthesia: local infiltration    Anesthesia:  Local Anesthetic: lidocaine 1% without epinephrine  Irrigation solution: saline  Irrigation method: tap  Amount of cleaning: standard  Debridement: none  Degree of undermining: none  Skin closure: glue  Dressing: dressing applied  Patient tolerance: Patient tolerated the procedure well with no immediate complications        Labs Reviewed - No data to display       Imaging Results          X-Ray Finger 2 or More Views Right (Final result)  Result time 11/05/18 23:56:52    Final result by Avlia Pereyra MD (11/05/18 23:56:52)                 Impression:      As above.      Electronically signed by: Avila Pereyra MD  Date:    11/05/2018  Time:    23:56             Narrative:    EXAMINATION:  XR FINGER 2 OR MORE VIEWS RIGHT    CLINICAL HISTORY:  Laceration right hand index finger    TECHNIQUE:  Three views of the right hand index finger.    COMPARISON:  None    FINDINGS:  Small 3 mm curvilinear density projects just dorsal to the distal terminal phalanx which could be tiny fracture fragment versus  foreign body.  Otherwise unremarkable.                                 Medical Decision Making:   Initial Assessment:   Laceration of the right index fingernail  Differential Diagnosis:   Laceration                      Clinical Impression:   The encounter diagnosis was Laceration of right index finger without foreign body with damage to nail, initial encounter.      Disposition:   Disposition: Discharged  Condition: Stable                        Tatyana Gaona MD  11/18/18 2242

## 2018-11-12 ENCOUNTER — TELEPHONE (OUTPATIENT)
Dept: FAMILY MEDICINE | Facility: CLINIC | Age: 28
End: 2018-11-12

## 2018-11-12 DIAGNOSIS — M79.646 PAIN OF FINGER, UNSPECIFIED LATERALITY: Primary | ICD-10-CM

## 2018-11-12 RX ORDER — HYDROCODONE BITARTRATE AND ACETAMINOPHEN 5; 325 MG/1; MG/1
1 TABLET ORAL
Qty: 8 TABLET | Refills: 0 | Status: SHIPPED | OUTPATIENT
Start: 2018-11-12 | End: 2018-11-22

## 2018-11-12 NOTE — TELEPHONE ENCOUNTER
Pt was seen in ER on 11/6/18--He cut his finger with a  and was given a printed rx for Norco 5-325mg, 1 tab every 4 hrs prn pain #8.  He seems to have lost this rx and wants to know if [facility]. The finger is pounding and Ibuprofen is not helping.

## 2019-01-17 ENCOUNTER — HOSPITAL ENCOUNTER (EMERGENCY)
Facility: HOSPITAL | Age: 29
Discharge: LEFT BEFORE DISCHARGE | End: 2019-01-17
Attending: SURGERY
Payer: COMMERCIAL

## 2019-01-17 ENCOUNTER — TELEPHONE (OUTPATIENT)
Dept: FAMILY MEDICINE | Facility: CLINIC | Age: 29
End: 2019-01-17

## 2019-01-17 VITALS
HEART RATE: 102 BPM | WEIGHT: 170 LBS | BODY MASS INDEX: 23.71 KG/M2 | OXYGEN SATURATION: 99 % | SYSTOLIC BLOOD PRESSURE: 132 MMHG | RESPIRATION RATE: 20 BRPM | TEMPERATURE: 98 F | DIASTOLIC BLOOD PRESSURE: 72 MMHG

## 2019-01-17 DIAGNOSIS — R20.2 NUMBNESS AND TINGLING IN LEFT ARM: ICD-10-CM

## 2019-01-17 DIAGNOSIS — Z53.29 LEFT AGAINST MEDICAL ADVICE: Primary | ICD-10-CM

## 2019-01-17 DIAGNOSIS — R20.0 NUMBNESS AND TINGLING IN LEFT ARM: ICD-10-CM

## 2019-01-17 PROCEDURE — 99281 EMR DPT VST MAYX REQ PHY/QHP: CPT

## 2019-01-17 NOTE — TELEPHONE ENCOUNTER
----- Message from Isaiah Rodrigues sent at 2019  8:40 AM CST -----  Contact: Mom - Citlaly Perez  MRN: [id number]  : 1990  PCP: Tyler Lewis  Home Phone      [phone]  Work Phone      Not on file.  Mobile          [phone]      MESSAGE: hand numbness -- requesting appt today with Dr Lewis    Call Citlaly @ [phone]    PCP: Johsua

## 2019-01-17 NOTE — TELEPHONE ENCOUNTER
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 Denies any pain or any other symptoms. Offered appt for Tuesday and stated they will call back

## 2019-01-17 NOTE — ED PROVIDER NOTES
Encounter Date: 2019       History     Chief Complaint   Patient presents with    Arm Numbness     left arm numbness reported.      Rojelio Perez is a 28 y.o. male who presents to the ED with reports of left arm numbness that started yesterday after working with a stump  most of the day yesterday. Reports numbness and tingling to left mid-forearm through to left hand. Denies trauma; denies chest pain or sob.       The history is provided by the patient.     Review of patient's allergies indicates:   Allergen Reactions    Ativan [lorazepam] Hallucinations    Ceclor [cefaclor] Other (See Comments)     unknown     Past Medical History:   Diagnosis Date    Anxiety     Bulging lumbar disc     Medial meniscus tear     RIGHT KNEE     Past Surgical History:   Procedure Laterality Date    ARTHROSCOPY-KNEE Right 2018    Performed by Mark Meyer MD at [facility] OR    WISDOM TOOTH EXTRACTION       Family History   Problem Relation Age of Onset    No Known Problems Mother     No Known Problems Father      Social History     Tobacco Use    Smoking status: Former Smoker     Types: Vaping w/o nicotine, Cigarettes     Last attempt to quit: 2017     Years since quittin.3    Smokeless tobacco: Never Used   Substance Use Topics    Alcohol use: No    Drug use: Yes     Types: Marijuana     Comment: DAILY     Review of Systems   Constitutional: Negative.  Negative for appetite change, chills and fever.   HENT: Negative.  Negative for congestion, ear discharge, ear pain, postnasal drip, rhinorrhea and sore throat.    Eyes: Negative.    Respiratory: Negative.  Negative for cough, chest tightness and shortness of breath.    Cardiovascular: Negative.  Negative for chest pain.   Gastrointestinal: Negative.  Negative for abdominal distention, abdominal pain and nausea.   Endocrine: Negative.    Genitourinary: Negative.  Negative for dysuria, frequency, hematuria and urgency.   Musculoskeletal:  Negative for back pain.   Skin: Negative.  Negative for pallor and rash.   Allergic/Immunologic: Negative.    Neurological: Positive for numbness. Negative for dizziness, syncope, weakness and light-headedness.        Left mid-forearm to left hand with difficulty grasping    Hematological: Negative.  Does not bruise/bleed easily.   Psychiatric/Behavioral: The patient is hyperactive.        Physical Exam     Initial Vitals [01/17/19 1133]   BP Pulse Resp Temp SpO2   132/72 102 20 98 °F (36.7 °C) 99 %      MAP       --         Physical Exam    Nursing note and vitals reviewed.  Constitutional: He appears well-developed and well-nourished.   HENT:   Head: Normocephalic and atraumatic.   Eyes: Conjunctivae and EOM are normal. Pupils are equal, round, and reactive to light.   Neck: Neck supple.   Cardiovascular: Normal rate, regular rhythm, normal heart sounds and intact distal pulses.   Pulmonary/Chest: Breath sounds normal.   Abdominal: Soft. Bowel sounds are normal.   Musculoskeletal: Normal range of motion.   Neurological: He is alert and oriented to person, place, and time. He has normal strength.   Skin: Skin is warm and dry.   Psychiatric: He has a normal mood and affect. His behavior is normal. Judgment and thought content normal.         ED Course   Procedures  Labs Reviewed - No data to display       Imaging Results          X-Ray Elbow 2 Views Left (In process)    Procedure changed from X-Ray Elbow Complete Left                                       Clinical Impression:   The primary encounter diagnosis was Left against medical advice. A diagnosis of Numbness and tingling in left arm was also pertinent to this visit.      Disposition:   Disposition: AMA  Condition: Stable         X-ray came to get this patient to do a arm x-ray for possible ulnar nerve issues  Patient walked out of the emergency room before full evaluation  He did not sign any paperwork, this is obviously against medical advice                Jovon Daniels MD  01/17/19 6321

## 2019-01-28 ENCOUNTER — OFFICE VISIT (OUTPATIENT)
Dept: FAMILY MEDICINE | Facility: CLINIC | Age: 29
End: 2019-01-28
Payer: COMMERCIAL

## 2019-01-28 ENCOUNTER — TELEPHONE (OUTPATIENT)
Dept: FAMILY MEDICINE | Facility: CLINIC | Age: 29
End: 2019-01-28

## 2019-01-28 VITALS
HEART RATE: 100 BPM | BODY MASS INDEX: 23.63 KG/M2 | DIASTOLIC BLOOD PRESSURE: 72 MMHG | WEIGHT: 168.81 LBS | RESPIRATION RATE: 20 BRPM | HEIGHT: 71 IN | SYSTOLIC BLOOD PRESSURE: 134 MMHG

## 2019-01-28 DIAGNOSIS — G56.02 CARPAL TUNNEL SYNDROME OF LEFT WRIST: Primary | ICD-10-CM

## 2019-01-28 DIAGNOSIS — M25.561 RECURRENT PAIN OF RIGHT KNEE: ICD-10-CM

## 2019-01-28 DIAGNOSIS — G58.9 NERVE PALSY: ICD-10-CM

## 2019-01-28 PROCEDURE — 3008F BODY MASS INDEX DOCD: CPT | Mod: CPTII,S$GLB,, | Performed by: FAMILY MEDICINE

## 2019-01-28 PROCEDURE — 99213 OFFICE O/P EST LOW 20 MIN: CPT | Mod: S$GLB,,, | Performed by: FAMILY MEDICINE

## 2019-01-28 PROCEDURE — 99213 PR OFFICE/OUTPT VISIT, EST, LEVL III, 20-29 MIN: ICD-10-PCS | Mod: S$GLB,,, | Performed by: FAMILY MEDICINE

## 2019-01-28 PROCEDURE — 99999 PR PBB SHADOW E&M-EST. PATIENT-LVL III: CPT | Mod: PBBFAC,,, | Performed by: FAMILY MEDICINE

## 2019-01-28 PROCEDURE — 99999 PR PBB SHADOW E&M-EST. PATIENT-LVL III: ICD-10-PCS | Mod: PBBFAC,,, | Performed by: FAMILY MEDICINE

## 2019-01-28 PROCEDURE — 3008F PR BODY MASS INDEX (BMI) DOCUMENTED: ICD-10-PCS | Mod: CPTII,S$GLB,, | Performed by: FAMILY MEDICINE

## 2019-01-28 RX ORDER — CELECOXIB 200 MG/1
200 CAPSULE ORAL DAILY
Qty: 30 CAPSULE | Refills: 5
Start: 2019-01-28 | End: 2019-02-01 | Stop reason: ALTCHOICE

## 2019-01-28 RX ORDER — PREDNISONE 5 MG/1
TABLET ORAL
Qty: 35 TABLET | Refills: 0 | Status: SHIPPED | OUTPATIENT
Start: 2019-01-28 | End: 2023-09-26
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
## 2019-01-28 NOTE — TELEPHONE ENCOUNTER
----- Message from Marialuisa Amezquita sent at 2019  3:43 PM CST -----  Contact: [place] - [facility]  Rojelio Perez  MRN: [id number]  : 1990  PCP: Tyler Lewis  Home Phone      [phone]  Work Phone      Not on file.  Mobile          [phone]      MESSAGE:     pt came to see  and spoke about his hand but wanted to talk to the nurse about a sleeping problem because he forgot while here.     [phone] (Philip)

## 2019-01-28 NOTE — PATIENT INSTRUCTIONS
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
## 2019-01-29 NOTE — TELEPHONE ENCOUNTER
Wow..another whole chapter.Make sure he is sleeping 6-8 hrs/night and caffeine in am and none after lunch. RTC in 2 weeks for further questions.

## 2019-02-01 ENCOUNTER — TELEPHONE (OUTPATIENT)
Dept: FAMILY MEDICINE | Facility: CLINIC | Age: 29
End: 2019-02-01

## 2019-02-01 ENCOUNTER — OFFICE VISIT (OUTPATIENT)
Dept: FAMILY MEDICINE | Facility: CLINIC | Age: 29
End: 2019-02-01
Payer: COMMERCIAL

## 2019-02-01 VITALS
BODY MASS INDEX: 21.97 KG/M2 | RESPIRATION RATE: 18 BRPM | HEART RATE: 60 BPM | SYSTOLIC BLOOD PRESSURE: 98 MMHG | HEIGHT: 72 IN | WEIGHT: 162.19 LBS | DIASTOLIC BLOOD PRESSURE: 62 MMHG

## 2019-02-01 DIAGNOSIS — G47.33 OSA (OBSTRUCTIVE SLEEP APNEA): ICD-10-CM

## 2019-02-01 DIAGNOSIS — G47.19 EXCESSIVE DAYTIME SLEEPINESS: Primary | ICD-10-CM

## 2019-02-01 DIAGNOSIS — R29.898 LEFT HAND WEAKNESS: ICD-10-CM

## 2019-02-01 PROCEDURE — 99213 OFFICE O/P EST LOW 20 MIN: CPT | Mod: S$GLB,,, | Performed by: FAMILY MEDICINE

## 2019-02-01 PROCEDURE — 99999 PR PBB SHADOW E&M-EST. PATIENT-LVL III: CPT | Mod: PBBFAC,,, | Performed by: FAMILY MEDICINE

## 2019-02-01 PROCEDURE — 99999 PR PBB SHADOW E&M-EST. PATIENT-LVL III: ICD-10-PCS | Mod: PBBFAC,,, | Performed by: FAMILY MEDICINE

## 2019-02-01 PROCEDURE — 3008F BODY MASS INDEX DOCD: CPT | Mod: CPTII,S$GLB,, | Performed by: FAMILY MEDICINE

## 2019-02-01 PROCEDURE — 99213 PR OFFICE/OUTPT VISIT, EST, LEVL III, 20-29 MIN: ICD-10-PCS | Mod: S$GLB,,, | Performed by: FAMILY MEDICINE

## 2019-02-01 PROCEDURE — 3008F PR BODY MASS INDEX (BMI) DOCUMENTED: ICD-10-PCS | Mod: CPTII,S$GLB,, | Performed by: FAMILY MEDICINE

## 2019-02-01 RX ORDER — MODAFINIL 200 MG/1
200 TABLET ORAL DAILY
Qty: 30 TABLET | Refills: 2 | Status: SHIPPED | OUTPATIENT
Start: 2019-02-01 | End: 2019-03-03

## 2019-02-01 RX ORDER — LANOLIN ALCOHOL/MO/W.PET/CERES
400 CREAM (GRAM) TOPICAL DAILY
COMMUNITY

## 2019-02-01 NOTE — PROGRESS NOTES
Subjective:       Patient ID: Rojelio Perez is a 28 y.o. male.    Chief Complaint: Loss of Consciousness (Pt has episodes of lightheadedness and even passing out at times over the past year. )    Pt is a 28 y.o. male who presents for check up for Excessive daytime sleepiness  (primary encounter diagnosis)  Jeremy (obstructive sleep apnea). Doing well on current meds. Denies any side effects. Prevention is up to date.      Review of Systems   Constitutional: Negative for appetite change.   HENT: Negative for congestion, ear pain, sneezing and sore throat.    Eyes: Negative for redness and visual disturbance.   Respiratory: Negative for cough, chest tightness and stridor.    Cardiovascular: Negative for chest pain.   Gastrointestinal: Negative for abdominal pain, blood in stool, diarrhea, nausea and vomiting.   Genitourinary: Negative for difficulty urinating, dysuria and hematuria.   Musculoskeletal: Negative for arthralgias, back pain, joint swelling, myalgias and neck pain.   Skin: Negative for rash.   Neurological: Positive for weakness. Negative for dizziness.        Excess daytime sleepiness; and falls asleep and is mentally confused for a few moments; occurring every afternoon   Psychiatric/Behavioral: Negative for agitation. The patient is not nervous/anxious.        Objective:      Physical Exam   Constitutional: He appears well-developed and well-nourished.   HENT:   Head: Normocephalic and atraumatic.   Right Ear: Tympanic membrane and external ear normal.   Left Ear: Tympanic membrane and external ear normal.   Nose: Mucosal edema and rhinorrhea present.   Mouth/Throat: Uvula is midline.   Eyes: Conjunctivae are normal.   Neck: Trachea normal and normal range of motion. Neck supple. No thyromegaly present.   Cardiovascular: Normal rate, regular rhythm, S1 normal, S2 normal and normal heart sounds.   No murmur heard.  Pulmonary/Chest: Effort normal. No respiratory distress.   Abdominal: Soft. Normal  appearance. There is no tenderness.   Musculoskeletal:   L hand with coolness and poor    Lymphadenopathy:     He has no cervical adenopathy.   Neurological: He is alert.   Skin: Skin is warm, dry and intact.   Psychiatric: He has a normal mood and affect. His speech is normal.   Vitals reviewed.      Assessment:       1. Excessive daytime sleepiness    2. CLARI (obstructive sleep apnea)    3. Left hand weakness        Plan:   Rojelio was seen today for loss of consciousness.    Diagnoses and all orders for this visit:    Excessive daytime sleepiness  -     Polysomnogram (CPAP will be added if patient meets diagnostic criteria.); Future    CLARI (obstructive sleep apnea)  -     Polysomnogram (CPAP will be added if patient meets diagnostic criteria.); Future    Left hand weakness    Other orders  -     modafinil (PROVIGIL) 200 MG Tab; Take 1 tablet (200 mg total) by mouth once daily.    Avoid vibration and wear a glove

## 2020-03-25 ENCOUNTER — OFFICE VISIT (OUTPATIENT)
Dept: FAMILY MEDICINE | Facility: CLINIC | Age: 30
End: 2020-03-25

## 2020-03-25 ENCOUNTER — TELEPHONE (OUTPATIENT)
Dept: FAMILY MEDICINE | Facility: CLINIC | Age: 30
End: 2020-03-25

## 2020-03-25 VITALS
OXYGEN SATURATION: 99 % | HEART RATE: 96 BPM | TEMPERATURE: 98 F | SYSTOLIC BLOOD PRESSURE: 110 MMHG | DIASTOLIC BLOOD PRESSURE: 75 MMHG

## 2020-03-25 DIAGNOSIS — R11.0 NAUSEA: ICD-10-CM

## 2020-03-25 DIAGNOSIS — R19.7 DIARRHEA, UNSPECIFIED TYPE: Primary | ICD-10-CM

## 2020-03-25 PROCEDURE — 99999 PR PBB SHADOW E&M-EST. PATIENT-LVL II: ICD-10-PCS | Mod: PBBFAC,,, | Performed by: FAMILY MEDICINE

## 2020-03-25 PROCEDURE — 99213 OFFICE O/P EST LOW 20 MIN: CPT | Mod: S$PBB,,, | Performed by: FAMILY MEDICINE

## 2020-03-25 PROCEDURE — 99213 PR OFFICE/OUTPT VISIT, EST, LEVL III, 20-29 MIN: ICD-10-PCS | Mod: S$PBB,,, | Performed by: FAMILY MEDICINE

## 2020-03-25 PROCEDURE — 99212 OFFICE O/P EST SF 10 MIN: CPT | Mod: PBBFAC | Performed by: FAMILY MEDICINE

## 2020-03-25 PROCEDURE — 99999 PR PBB SHADOW E&M-EST. PATIENT-LVL II: CPT | Mod: PBBFAC,,, | Performed by: FAMILY MEDICINE

## 2020-03-25 RX ORDER — DIPHENOXYLATE HYDROCHLORIDE AND ATROPINE SULFATE 2.5; .025 MG/1; MG/1
1 TABLET ORAL 4 TIMES DAILY PRN
Qty: 20 TABLET | Refills: 0 | Status: SHIPPED | OUTPATIENT
Start: 2020-03-25 | End: 2020-04-04

## 2020-03-25 RX ORDER — ONDANSETRON 8 MG/1
8 TABLET, ORALLY DISINTEGRATING ORAL EVERY 8 HOURS PRN
Qty: 20 TABLET | Refills: 0 | Status: SHIPPED | OUTPATIENT
Start: 2020-03-25 | End: 2023-09-26

## 2020-03-25 NOTE — TELEPHONE ENCOUNTER
----- Message from Isaiah Rodrigues sent at 3/25/2020  9:43 AM CDT -----  Contact: Patient  Rojelio Perez  MRN: [id number]  : 1990  PCP: Tyler Lewis  Home Phone      [phone]  Work Phone      Not on file.  Mobile          [phone]      MESSAGE: work wants him to be tested for Covid 19 - fever since  - has gone as high as 103 - vomiting - bad diarrhea  night -- unable to taste -- unsure of exposure -- please advise    Call [phone]    PCP: Joshua

## 2020-03-25 NOTE — PROGRESS NOTES
Subjective:       Patient ID: Rojelio Perez is a 29 y.o. male.    Chief Complaint: No chief complaint on file.    Pt is a 29 y.o. male who presents for evaluation and management of   Encounter Diagnoses   Name Primary?    Diarrhea, unspecified type Yes    Nausea    .  Doing well on current meds. Denies any side effects. Prevention is up to date.    Review of Systems   Constitutional: Positive for chills and fever.   Respiratory: Negative for cough and shortness of breath.    Gastrointestinal: Positive for diarrhea.       Objective:      Physical Exam   Constitutional: He is oriented to person, place, and time. He appears well-developed and well-nourished.   HENT:   Head: Normocephalic and atraumatic.   Right Ear: External ear normal.   Left Ear: External ear normal.   Nose: Nose normal.   Mouth/Throat: Oropharynx is clear and moist.   Eyes: Pupils are equal, round, and reactive to light. Conjunctivae and EOM are normal. Right eye exhibits no discharge. Left eye exhibits no discharge. No scleral icterus.   Neck: Normal range of motion. Neck supple. No JVD present. No tracheal deviation present. No thyromegaly present.   Cardiovascular: Normal rate, regular rhythm, normal heart sounds and intact distal pulses.   No murmur heard.  Pulmonary/Chest: Effort normal and breath sounds normal. No respiratory distress. He has no wheezes. He has no rales. He exhibits no tenderness.   Abdominal: Soft. Bowel sounds are normal. He exhibits no distension and no mass. There is no tenderness. There is no rebound and no guarding.   Musculoskeletal: Normal range of motion.   Lymphadenopathy:     He has no cervical adenopathy.   Neurological: He is alert and oriented to person, place, and time. He has normal reflexes. He displays normal reflexes. No cranial nerve deficit. He exhibits normal muscle tone. Coordination normal.   Skin: Skin is warm and dry.   Psychiatric: He has a normal mood and affect. His behavior is normal.  Judgment and thought content normal.       Assessment:       1. Diarrhea, unspecified type    2. Nausea        Plan:   Diagnoses and all orders for this visit:    Diarrhea, unspecified type  -     diphenoxylate-atropine 2.5-0.025 mg (LOMOTIL) 2.5-0.025 mg per tablet; Take 1 tablet by mouth 4 (four) times daily as needed for Diarrhea.    Nausea  -     ondansetron (ZOFRAN-ODT) 8 MG TbDL; Take 1 tablet (8 mg total) by mouth every 8 (eight) hours as needed.      Problem List Items Addressed This Visit     None      Visit Diagnoses     Diarrhea, unspecified type    -  Primary    Nausea            No follow-ups on file.

## 2020-06-09 ENCOUNTER — TELEPHONE (OUTPATIENT)
Dept: FAMILY MEDICINE | Facility: CLINIC | Age: 30
End: 2020-06-09

## 2020-06-09 NOTE — TELEPHONE ENCOUNTER
----- Message from Isaiah Rodrigues sent at 2020 11:21 AM CDT -----  Contact: Friend  Rojelio Perez  MRN: [id number]  : 1990  PCP: Tyler Lewis  Home Phone      [phone]  Work Phone      Not on file.  Mobile          [phone]      MESSAGE: requesting appt today or tomorrow - friend was not told what appt was needed for - please contact patient    Call [phone]    PCP: Joshua

## 2020-06-10 ENCOUNTER — TELEPHONE (OUTPATIENT)
Dept: FAMILY MEDICINE | Facility: CLINIC | Age: 30
End: 2020-06-10

## 2020-06-10 RX ORDER — AZITHROMYCIN 1 G/1
1 POWDER, FOR SUSPENSION ORAL ONCE
Qty: 1 PACKET | Refills: 0 | Status: SHIPPED | OUTPATIENT
Start: 2020-06-10 | End: 2020-06-10

## 2020-06-10 NOTE — TELEPHONE ENCOUNTER
----- Message from Stephany Castanon sent at 6/10/2020  1:49 PM CDT -----  Rojelio Perez  MRN: [id number]  : 1990  PCP: Tyler Lewis  Home Phone      [phone]  Work Phone      Not on file.  Mobile          [phone]      MESSAGE:   Pt requests a sooner appointment than the  can schedule.  Does patient feel like they need to be seen today:  no  What is the nature of the appointment:  personal  What visit type:  est  Did you check other providers/department schedules for availability:   yes  Comments:     Phone:  [phone]

## 2020-06-10 NOTE — TELEPHONE ENCOUNTER
Partner tested positive for chlamydia. His partner is picking up her antibiotics - They have been having unprotected sex.     Pt states that this has been his only partner and will continue to be. He will come in for an appointment if it's absolutely needed, but he is uninsured and is only concerned about being treated for this problem.    LOV: 03/25/2020 dana/Rose.     Pharmacy: [facility].

## 2020-07-18 NOTE — PROGRESS NOTES
The following lab results were abnormal. The following recommendations were made:  Can tell Rojelio  His MRI is the same as 2015; getting seen by a pin doc might his next best option for pain relief as Dr Whalen, if he is wanting to go that route-not surgery   started at age 14 Percoset after back surgery prescribed Librium which pt feels helps with anxiety and alcohol use.

## 2021-09-21 NOTE — ED NOTES
Discharged to home/self care.    - Condition at discharge: Good  - Mode of Discharge: Ambulatory  - The patient left the ED accompanied by a family member.  - The discharge instructions were discussed with the patient.  - They state an understanding of the discharge instructions.  - Walked pt to the discharge station.     Xray Chest 2 Views PA/Lat

## 2021-10-04 ENCOUNTER — TELEPHONE (OUTPATIENT)
Dept: FAMILY MEDICINE | Facility: CLINIC | Age: 31
End: 2021-10-04

## 2022-02-08 ENCOUNTER — OFFICE VISIT (OUTPATIENT)
Dept: FAMILY MEDICINE | Facility: CLINIC | Age: 32
End: 2022-02-08
Payer: COMMERCIAL

## 2022-02-08 ENCOUNTER — APPOINTMENT (OUTPATIENT)
Dept: RADIOLOGY | Facility: CLINIC | Age: 32
End: 2022-02-08
Attending: FAMILY MEDICINE
Payer: COMMERCIAL

## 2022-02-08 VITALS
SYSTOLIC BLOOD PRESSURE: 114 MMHG | HEIGHT: 72 IN | DIASTOLIC BLOOD PRESSURE: 70 MMHG | RESPIRATION RATE: 16 BRPM | WEIGHT: 196 LBS | BODY MASS INDEX: 26.55 KG/M2 | HEART RATE: 52 BPM

## 2022-02-08 DIAGNOSIS — M25.532 LEFT WRIST PAIN: Primary | ICD-10-CM

## 2022-02-08 DIAGNOSIS — M25.532 LEFT WRIST PAIN: ICD-10-CM

## 2022-02-08 PROCEDURE — 1159F PR MEDICATION LIST DOCUMENTED IN MEDICAL RECORD: ICD-10-PCS | Mod: CPTII,S$GLB,, | Performed by: FAMILY MEDICINE

## 2022-02-08 PROCEDURE — 73110 X-RAY EXAM OF WRIST: CPT | Mod: 26,LT,, | Performed by: RADIOLOGY

## 2022-02-08 PROCEDURE — 1159F MED LIST DOCD IN RCRD: CPT | Mod: CPTII,S$GLB,, | Performed by: FAMILY MEDICINE

## 2022-02-08 PROCEDURE — 3078F DIAST BP <80 MM HG: CPT | Mod: CPTII,S$GLB,, | Performed by: FAMILY MEDICINE

## 2022-02-08 PROCEDURE — 73110 X-RAY EXAM OF WRIST: CPT | Mod: TC,PO,LT

## 2022-02-08 PROCEDURE — 99213 PR OFFICE/OUTPT VISIT, EST, LEVL III, 20-29 MIN: ICD-10-PCS | Mod: S$GLB,,, | Performed by: FAMILY MEDICINE

## 2022-02-08 PROCEDURE — 3074F SYST BP LT 130 MM HG: CPT | Mod: CPTII,S$GLB,, | Performed by: FAMILY MEDICINE

## 2022-02-08 PROCEDURE — 3008F BODY MASS INDEX DOCD: CPT | Mod: CPTII,S$GLB,, | Performed by: FAMILY MEDICINE

## 2022-02-08 PROCEDURE — 99999 PR PBB SHADOW E&M-EST. PATIENT-LVL III: ICD-10-PCS | Mod: PBBFAC,,, | Performed by: FAMILY MEDICINE

## 2022-02-08 PROCEDURE — 3074F PR MOST RECENT SYSTOLIC BLOOD PRESSURE < 130 MM HG: ICD-10-PCS | Mod: CPTII,S$GLB,, | Performed by: FAMILY MEDICINE

## 2022-02-08 PROCEDURE — 3008F PR BODY MASS INDEX (BMI) DOCUMENTED: ICD-10-PCS | Mod: CPTII,S$GLB,, | Performed by: FAMILY MEDICINE

## 2022-02-08 PROCEDURE — 3078F PR MOST RECENT DIASTOLIC BLOOD PRESSURE < 80 MM HG: ICD-10-PCS | Mod: CPTII,S$GLB,, | Performed by: FAMILY MEDICINE

## 2022-02-08 PROCEDURE — 73110 XR WRIST COMPLETE 3 VIEWS LEFT: ICD-10-PCS | Mod: 26,LT,, | Performed by: RADIOLOGY

## 2022-02-08 PROCEDURE — 99213 OFFICE O/P EST LOW 20 MIN: CPT | Mod: S$GLB,,, | Performed by: FAMILY MEDICINE

## 2022-02-08 PROCEDURE — 99999 PR PBB SHADOW E&M-EST. PATIENT-LVL III: CPT | Mod: PBBFAC,,, | Performed by: FAMILY MEDICINE

## 2022-02-08 RX ORDER — NAPROXEN 500 MG/1
500 TABLET ORAL 2 TIMES DAILY
Qty: 60 TABLET | Refills: 5 | Status: SHIPPED | OUTPATIENT
Start: 2022-02-08 | End: 2023-09-26

## 2022-02-08 NOTE — LETTER
February 8, 2022      [address] [address]  Phone: [phone]  Fax: [phone]       Patient: Rojelio Perez   YOB: 1990  Date of Visit: 02/08/2022    To Whom It May Concern:    Ivette Perez  was at [facility] on 02/08/2022. The patient may return to work/school on 02/09/2022 With/no restrictions. If you have any questions or concerns, or if I can be of further assistance, please do not hesitate to contact me.    Sincerely,    HARRISON Monae MD

## 2022-02-08 NOTE — PROGRESS NOTES
Subjective:       Patient ID: Rojelio Perez is a 31 y.o. male.    Chief Complaint: Wrist Pain (Pt states that he was cutting down a tree with an axe and hit his left wrist on the tree. Pt states that this happened about 1 month ago. Pt states that his wrist does hurt at times when he moves it a certain way also, has some swelling. Pt states that he has not been taking any medication. Pt states that he does do ice and heat compresses to help the swelling go down. )    HPI  Review of Systems   Constitutional: Negative for appetite change.   HENT: Negative for congestion, ear pain, sneezing and sore throat.    Eyes: Negative for redness and visual disturbance.   Respiratory: Negative for cough, chest tightness and stridor.    Cardiovascular: Negative for chest pain.   Gastrointestinal: Negative for abdominal pain, blood in stool, diarrhea, nausea and vomiting.   Genitourinary: Negative for difficulty urinating, dysuria and hematuria.   Musculoskeletal: Positive for arthralgias. Negative for back pain, joint swelling, myalgias and neck pain.        L wrist 8/10 pain on & off and bad at night; no  in his L hand   Skin: Negative for rash.   Neurological: Negative for dizziness.   Psychiatric/Behavioral: Negative for agitation. The patient is not nervous/anxious.        Objective:      Physical Exam  Constitutional:       Appearance: He is well-developed and well-nourished.   HENT:      Head: Normocephalic.   Eyes:      Pupils: Pupils are equal, round, and reactive to light.   Neck:      Thyroid: No thyromegaly.   Cardiovascular:      Rate and Rhythm: Normal rate and regular rhythm.      Heart sounds: No murmur heard.  No friction rub.   Pulmonary:      Effort: Pulmonary effort is normal. No respiratory distress.      Breath sounds: No wheezing.   Abdominal:      Tenderness: There is no abdominal tenderness. There is no guarding or rebound.   Musculoskeletal:         General: Swelling and tenderness present. No  edema. Normal range of motion.      Cervical back: Normal range of motion and neck supple.      Comments: L wrist and thenar TTP 2++   Lymphadenopathy:      Cervical: No cervical adenopathy.   Skin:     General: Skin is warm and dry.   Neurological:      Mental Status: He is alert and oriented to person, place, and time.      Cranial Nerves: No cranial nerve deficit.      Deep Tendon Reflexes: Reflexes are normal and symmetric.   Psychiatric:         Mood and Affect: Mood and affect normal.         Thought Content: Thought content normal.         Judgment: Judgment normal.         Assessment:       1. Left wrist pain        Plan:   Rojelio was seen today for wrist pain.    Diagnoses and all orders for this visit:    Left wrist pain  -     X-Ray Wrist Complete Left; Future

## 2023-09-26 ENCOUNTER — OFFICE VISIT (OUTPATIENT)
Dept: FAMILY MEDICINE | Facility: CLINIC | Age: 33
End: 2023-09-26
Payer: COMMERCIAL

## 2023-09-26 VITALS
WEIGHT: 212.31 LBS | SYSTOLIC BLOOD PRESSURE: 114 MMHG | HEIGHT: 72 IN | RESPIRATION RATE: 12 BRPM | HEART RATE: 76 BPM | BODY MASS INDEX: 28.76 KG/M2 | DIASTOLIC BLOOD PRESSURE: 66 MMHG

## 2023-09-26 DIAGNOSIS — S70.362A TICK BITE OF LEFT THIGH, INITIAL ENCOUNTER: ICD-10-CM
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
## 2023-09-26 PROCEDURE — 99999 PR PBB SHADOW E&M-EST. PATIENT-LVL III: ICD-10-PCS | Mod: PBBFAC,,, | Performed by: STUDENT IN AN ORGANIZED HEALTH CARE EDUCATION/TRAINING PROGRAM

## 2023-09-26 PROCEDURE — 99213 OFFICE O/P EST LOW 20 MIN: CPT | Mod: S$GLB,,, | Performed by: STUDENT IN AN ORGANIZED HEALTH CARE EDUCATION/TRAINING PROGRAM

## 2023-09-26 PROCEDURE — 3074F SYST BP LT 130 MM HG: CPT | Mod: CPTII,S$GLB,, | Performed by: STUDENT IN AN ORGANIZED HEALTH CARE EDUCATION/TRAINING PROGRAM

## 2023-09-26 PROCEDURE — 3074F PR MOST RECENT SYSTOLIC BLOOD PRESSURE < 130 MM HG: ICD-10-PCS | Mod: CPTII,S$GLB,, | Performed by: STUDENT IN AN ORGANIZED HEALTH CARE EDUCATION/TRAINING PROGRAM

## 2023-09-26 PROCEDURE — 3078F DIAST BP <80 MM HG: CPT | Mod: CPTII,S$GLB,, | Performed by: STUDENT IN AN ORGANIZED HEALTH CARE EDUCATION/TRAINING PROGRAM

## 2023-09-26 PROCEDURE — 1159F PR MEDICATION LIST DOCUMENTED IN MEDICAL RECORD: ICD-10-PCS | Mod: CPTII,S$GLB,, | Performed by: STUDENT IN AN ORGANIZED HEALTH CARE EDUCATION/TRAINING PROGRAM

## 2023-09-26 PROCEDURE — 3008F PR BODY MASS INDEX (BMI) DOCUMENTED: ICD-10-PCS | Mod: CPTII,S$GLB,, | Performed by: STUDENT IN AN ORGANIZED HEALTH CARE EDUCATION/TRAINING PROGRAM

## 2023-09-26 PROCEDURE — 3008F BODY MASS INDEX DOCD: CPT | Mod: CPTII,S$GLB,, | Performed by: STUDENT IN AN ORGANIZED HEALTH CARE EDUCATION/TRAINING PROGRAM

## 2023-09-26 PROCEDURE — 99999 PR PBB SHADOW E&M-EST. PATIENT-LVL III: CPT | Mod: PBBFAC,,, | Performed by: STUDENT IN AN ORGANIZED HEALTH CARE EDUCATION/TRAINING PROGRAM

## 2023-09-26 PROCEDURE — 99213 PR OFFICE/OUTPT VISIT, EST, LEVL III, 20-29 MIN: ICD-10-PCS | Mod: S$GLB,,, | Performed by: STUDENT IN AN ORGANIZED HEALTH CARE EDUCATION/TRAINING PROGRAM

## 2023-09-26 PROCEDURE — 3078F PR MOST RECENT DIASTOLIC BLOOD PRESSURE < 80 MM HG: ICD-10-PCS | Mod: CPTII,S$GLB,, | Performed by: STUDENT IN AN ORGANIZED HEALTH CARE EDUCATION/TRAINING PROGRAM

## 2023-09-26 PROCEDURE — 1159F MED LIST DOCD IN RCRD: CPT | Mod: CPTII,S$GLB,, | Performed by: STUDENT IN AN ORGANIZED HEALTH CARE EDUCATION/TRAINING PROGRAM

## 2023-09-26 RX ORDER — DOXYCYCLINE 100 MG/1
100 CAPSULE ORAL 2 TIMES DAILY
Qty: 20 CAPSULE | Refills: 0 | Status: SHIPPED | OUTPATIENT
Start: 2023-09-26 | End: 2023-10-06

## 2023-09-26 NOTE — PROGRESS NOTES
Subjective:       Patient ID: Rojelio Perez is a 33 y.o. male.    Chief Complaint: Insect Bite (Pt here for evaluation of tick bite. Pt states has been experiencing fever/headache over the last week. )    Pt here for tic bite. States he noticed tic to his left thigh. Since that time he has been having headaches, aches, and fever. He has been applying mupirocin ointment with some improvement. He denies nausea, vomiting, diarrhea. He has some allergies with nasal congestion, but no cough.    Review of Systems   Constitutional:  Positive for activity change and fever.   HENT:  Negative for sore throat.    Respiratory:  Negative for cough and shortness of breath.    Cardiovascular:  Negative for chest pain.   Gastrointestinal:  Negative for abdominal pain, diarrhea, nausea and vomiting.   Skin:  Positive for color change and wound.   Allergic/Immunologic: Positive for environmental allergies.       Objective:      Physical Exam  Vitals reviewed.   Constitutional:       General: He is not in acute distress.  HENT:      Head: Normocephalic and atraumatic.   Cardiovascular:      Rate and Rhythm: Normal rate and regular rhythm.      Heart sounds: Normal heart sounds. No murmur heard.  Pulmonary:      Effort: Pulmonary effort is normal. No respiratory distress.      Breath sounds: Normal breath sounds.   Skin:     Comments: Punctate wound with surrounding erythema to left inner thigh to above knee   Neurological:      Mental Status: He is alert.         Assessment:       1. Infected tick bite, initial encounter    2. Tick bite of left thigh, initial encounter        Plan:           1. Infected tick bite, initial encounter  -     doxycycline (VIBRAMYCIN) 100 MG Cap; Take 1 capsule (100 mg total) by mouth 2 (two) times daily. for 10 days  Dispense: 20 capsule; Refill: 0    2. Tick bite of left thigh, initial encounter      Doxycycline  Cont mupirocin ointment  RTC for worsening symptoms or failure to improve, or RTC  PRN/as scheduled

## 2023-09-26 NOTE — LETTER
September 26, 2023      [address] [address]  Phone: [phone]  Fax: [phone]       Patient: Rojelio Perez   YOB: 1990  Date of Visit: 09/26/2023    To Whom It May Concern:    Ivette Perez  was at [facility] on 09/26/2023 for infected tick bite. The patient may return to work/school on 9/27/23 with no restrictions, please excuse any work missed due to his illness. If you have any questions or concerns, or if I can be of further assistance, please do not hesitate to contact me.    Sincerely,    Tyler Ramirez MD
